# Patient Record
Sex: MALE | Race: WHITE | NOT HISPANIC OR LATINO | Employment: OTHER | URBAN - NONMETROPOLITAN AREA
[De-identification: names, ages, dates, MRNs, and addresses within clinical notes are randomized per-mention and may not be internally consistent; named-entity substitution may affect disease eponyms.]

---

## 2017-05-10 RX ORDER — ATORVASTATIN CALCIUM 80 MG/1
TABLET, FILM COATED ORAL
Qty: 90 TABLET | Refills: 3 | Status: SHIPPED | OUTPATIENT
Start: 2017-05-10 | End: 2018-02-21 | Stop reason: SDUPTHER

## 2017-08-09 RX ORDER — LOSARTAN POTASSIUM 25 MG/1
TABLET ORAL
Qty: 90 TABLET | Refills: 1 | Status: SHIPPED | OUTPATIENT
Start: 2017-08-09 | End: 2018-02-19 | Stop reason: SDUPTHER

## 2018-02-13 RX ORDER — LOSARTAN POTASSIUM 25 MG/1
TABLET ORAL
Qty: 90 TABLET | Refills: 1 | OUTPATIENT
Start: 2018-02-13

## 2018-02-19 RX ORDER — LOSARTAN POTASSIUM 25 MG/1
TABLET ORAL
Qty: 90 TABLET | Refills: 0 | Status: SHIPPED | OUTPATIENT
Start: 2018-02-19 | End: 2018-02-21 | Stop reason: SDUPTHER

## 2018-02-21 ENCOUNTER — OFFICE VISIT (OUTPATIENT)
Dept: CARDIOLOGY | Facility: CLINIC | Age: 73
End: 2018-02-21

## 2018-02-21 VITALS
HEART RATE: 75 BPM | OXYGEN SATURATION: 99 % | HEIGHT: 68 IN | SYSTOLIC BLOOD PRESSURE: 120 MMHG | WEIGHT: 217 LBS | BODY MASS INDEX: 32.89 KG/M2 | DIASTOLIC BLOOD PRESSURE: 80 MMHG

## 2018-02-21 DIAGNOSIS — E78.5 DYSLIPIDEMIA: ICD-10-CM

## 2018-02-21 DIAGNOSIS — K21.9 GASTROESOPHAGEAL REFLUX DISEASE, ESOPHAGITIS PRESENCE NOT SPECIFIED: ICD-10-CM

## 2018-02-21 DIAGNOSIS — I10 ESSENTIAL HYPERTENSION: ICD-10-CM

## 2018-02-21 DIAGNOSIS — E66.09 CLASS 1 OBESITY DUE TO EXCESS CALORIES WITH SERIOUS COMORBIDITY AND BODY MASS INDEX (BMI) OF 32.0 TO 32.9 IN ADULT: ICD-10-CM

## 2018-02-21 DIAGNOSIS — I25.810 CORONARY ARTERY DISEASE INVOLVING CORONARY BYPASS GRAFT OF NATIVE HEART WITHOUT ANGINA PECTORIS: Primary | ICD-10-CM

## 2018-02-21 PROBLEM — E66.811 CLASS 1 OBESITY DUE TO EXCESS CALORIES WITH SERIOUS COMORBIDITY AND BODY MASS INDEX (BMI) OF 32.0 TO 32.9 IN ADULT: Status: ACTIVE | Noted: 2018-02-21

## 2018-02-21 PROCEDURE — 99214 OFFICE O/P EST MOD 30 MIN: CPT | Performed by: INTERNAL MEDICINE

## 2018-02-21 PROCEDURE — 93000 ELECTROCARDIOGRAM COMPLETE: CPT | Performed by: INTERNAL MEDICINE

## 2018-02-21 RX ORDER — ATORVASTATIN CALCIUM 80 MG/1
80 TABLET, FILM COATED ORAL NIGHTLY
Qty: 90 TABLET | Refills: 3 | Status: ON HOLD | OUTPATIENT
Start: 2018-02-21 | End: 2018-03-25

## 2018-02-21 RX ORDER — LOSARTAN POTASSIUM 25 MG/1
TABLET ORAL
Qty: 90 TABLET | Refills: 3 | Status: ON HOLD | OUTPATIENT
Start: 2018-02-21 | End: 2018-03-25

## 2018-02-21 RX ORDER — CLOPIDOGREL BISULFATE 75 MG/1
75 TABLET ORAL DAILY
Qty: 90 TABLET | Refills: 3 | Status: SHIPPED | OUTPATIENT
Start: 2018-02-21 | End: 2019-03-11 | Stop reason: SDUPTHER

## 2018-02-21 NOTE — PROGRESS NOTES
Reason for Visit: cardiovascular follow up.    HPI:  Orlin Mims is a 72 y.o. male is here today for 18 month follow-up.  He has been doing well and not having any major issues.  Has noticed some heartburn.  Happens about once every couple of weeks.   Does not take any antacids.  He denies any angina and stays active.  Does not get much formal exercise.  Does not eat as healthy as he feels like he should.  Has not had cholesterol checked recently.  Does not check blood pressure at home.      Previous Cardiac Testing and Procedures:  - LHC (11/25/2014) severe three-vessel CAD  - 3 vessel CABG (12/01/2014) LIMA to LAD, SVG to OM, SVG to PDA    Patient Active Problem List   Diagnosis   • Coronary artery disease involving coronary bypass graft of native heart without angina pectoris   • Essential hypertension   • Dyslipidemia   • Class 1 obesity due to excess calories with serious comorbidity and body mass index (BMI) of 32.0 to 32.9 in adult       Social History   Substance Use Topics   • Smoking status: Passive Smoke Exposure - Never Smoker   • Smokeless tobacco: Never Used   • Alcohol use Yes      Comment: occ       Family History   Problem Relation Age of Onset   • Heart disease Mother    • Lung cancer Father        The following portions of the patient's history were reviewed and updated as appropriate: allergies, current medications, past family history, past medical history, past social history, past surgical history and problem list.      Current Outpatient Prescriptions:   •  atorvastatin (LIPITOR) 80 MG tablet, Take 1 tablet by mouth Every Night., Disp: 90 tablet, Rfl: 3  •  clopidogrel (PLAVIX) 75 MG tablet, Take 1 tablet by mouth Daily., Disp: 90 tablet, Rfl: 3  •  losartan (COZAAR) 25 MG tablet, Take one tablet daily, Disp: 90 tablet, Rfl: 3    Review of Systems   Constitution: Positive for malaise/fatigue. Negative for chills, decreased appetite and fever.   HENT: Negative for congestion and  "nosebleeds.    Eyes: Negative for blurred vision and double vision.   Cardiovascular: Positive for chest pain (tightness). Negative for irregular heartbeat, leg swelling and palpitations.   Respiratory: Positive for shortness of breath. Negative for cough.    Endocrine: Negative for cold intolerance and heat intolerance.   Hematologic/Lymphatic: Bruises/bleeds easily.   Skin: Negative for dry skin, itching and rash.   Musculoskeletal: Positive for arthritis and joint pain. Negative for back pain, muscle cramps and neck pain.   Gastrointestinal: Negative for abdominal pain, constipation, diarrhea, heartburn and melena.   Genitourinary: Negative for dysuria, frequency and hematuria.   Neurological: Positive for dizziness, light-headedness and loss of balance. Negative for headaches.   Psychiatric/Behavioral: Negative for depression. The patient does not have insomnia and is not nervous/anxious.        Objective   /80 (BP Location: Left arm, Patient Position: Sitting, Cuff Size: Adult)  Pulse 75  Ht 172.7 cm (68\")  Wt 98.4 kg (217 lb)  SpO2 99%  BMI 32.99 kg/m2  Physical Exam   Constitutional: He is oriented to person, place, and time. He appears well-developed and well-nourished.   HENT:   Head: Normocephalic and atraumatic.   Cardiovascular: Normal rate, regular rhythm and normal heart sounds.    No murmur heard.  Pulmonary/Chest: Effort normal and breath sounds normal.   Musculoskeletal: He exhibits no edema.   Neurological: He is alert and oriented to person, place, and time.   Skin: Skin is warm and dry.   Psychiatric: He has a normal mood and affect.       ECG 12 Lead  Date/Time: 2/21/2018 11:37 AM  Performed by: DALE SHEN  Authorized by: DALE SHEN   Comparison: compared with previous ECG from 11/24/2014  Similar to previous ECG  Rhythm: sinus rhythm  Rate: normal  Conduction: incomplete LBBB  Q waves: V1, V2 and V3                ICD-10-CM ICD-9-CM   1. Coronary artery disease involving " coronary bypass graft of native heart without angina pectoris I25.810 414.05   2. Essential hypertension I10 401.9   3. Dyslipidemia E78.5 272.4   4. Class 1 obesity due to excess calories with serious comorbidity and body mass index (BMI) of 32.0 to 32.9 in adult E66.09 278.00    Z68.32 V85.32   5. Gastroesophageal reflux disease, esophagitis presence not specified K21.9 530.81         Assessment/Plan:  1. CAD: History of 3 vessel CABG.  Has only atypical chest pain more consistent with GERD.  If persists can consider stress.  Continue Plavix and atorvastatin.    2.  Essential hypertension: Well controlled on losartan.    3.  Dyslipidemia: managed on atorvastatin.  Check lipid panel.    4.  Obesity: BMI 32.99.   on diet, exercise, and weight loss.    5.  GERD: Discussed starting PPI but patient declines.  Can reconsider if symptoms worsen.

## 2018-02-27 DIAGNOSIS — E78.5 DYSLIPIDEMIA: Primary | ICD-10-CM

## 2018-03-24 ENCOUNTER — APPOINTMENT (OUTPATIENT)
Dept: CT IMAGING | Facility: HOSPITAL | Age: 73
End: 2018-03-24

## 2018-03-24 ENCOUNTER — APPOINTMENT (OUTPATIENT)
Dept: GENERAL RADIOLOGY | Facility: HOSPITAL | Age: 73
End: 2018-03-24

## 2018-03-24 ENCOUNTER — HOSPITAL ENCOUNTER (OUTPATIENT)
Facility: HOSPITAL | Age: 73
Setting detail: OBSERVATION
Discharge: HOME | End: 2018-03-25
Attending: EMERGENCY MEDICINE | Admitting: FAMILY MEDICINE

## 2018-03-24 DIAGNOSIS — R07.9 CHEST PAIN, UNSPECIFIED TYPE: ICD-10-CM

## 2018-03-24 DIAGNOSIS — R55 SYNCOPE AND COLLAPSE: Primary | ICD-10-CM

## 2018-03-24 PROBLEM — R07.2 PRECORDIAL PAIN: Status: ACTIVE | Noted: 2018-03-24

## 2018-03-24 LAB
ALBUMIN SERPL-MCNC: 4.3 G/DL (ref 3.5–5)
ALBUMIN/GLOB SERPL: 1.3 G/DL (ref 1.1–2.5)
ALP SERPL-CCNC: 88 U/L (ref 24–120)
ALT SERPL W P-5'-P-CCNC: 43 U/L (ref 0–54)
ANION GAP SERPL CALCULATED.3IONS-SCNC: 11 MMOL/L (ref 4–13)
AST SERPL-CCNC: 40 U/L (ref 7–45)
BASOPHILS # BLD AUTO: 0.02 10*3/MM3 (ref 0–0.2)
BASOPHILS NFR BLD AUTO: 0.3 % (ref 0–2)
BILIRUB SERPL-MCNC: 1.8 MG/DL (ref 0.1–1)
BUN BLD-MCNC: 23 MG/DL (ref 5–21)
BUN/CREAT SERPL: 20.9 (ref 7–25)
CALCIUM SPEC-SCNC: 10.1 MG/DL (ref 8.4–10.4)
CHLORIDE SERPL-SCNC: 104 MMOL/L (ref 98–110)
CO2 SERPL-SCNC: 26 MMOL/L (ref 24–31)
CREAT BLD-MCNC: 1.1 MG/DL (ref 0.5–1.4)
D DIMER PPP FEU-MCNC: 0.29 MG/L (FEU) (ref 0–0.5)
DEPRECATED RDW RBC AUTO: 39 FL (ref 40–54)
EOSINOPHIL # BLD AUTO: 0.13 10*3/MM3 (ref 0–0.7)
EOSINOPHIL NFR BLD AUTO: 1.8 % (ref 0–4)
ERYTHROCYTE [DISTWIDTH] IN BLOOD BY AUTOMATED COUNT: 13.1 % (ref 12–15)
GFR SERPL CREATININE-BSD FRML MDRD: 66 ML/MIN/1.73
GLOBULIN UR ELPH-MCNC: 3.4 GM/DL
GLUCOSE BLD-MCNC: 105 MG/DL (ref 70–100)
HCT VFR BLD AUTO: 45.1 % (ref 40–52)
HGB BLD-MCNC: 15.1 G/DL (ref 14–18)
HOLD SPECIMEN: NORMAL
HOLD SPECIMEN: NORMAL
IMM GRANULOCYTES # BLD: 0.05 10*3/MM3 (ref 0–0.03)
IMM GRANULOCYTES NFR BLD: 0.7 % (ref 0–5)
INR PPP: 0.94 (ref 0.91–1.09)
LYMPHOCYTES # BLD AUTO: 1.65 10*3/MM3 (ref 0.72–4.86)
LYMPHOCYTES NFR BLD AUTO: 22.4 % (ref 15–45)
MCH RBC QN AUTO: 27.6 PG (ref 28–32)
MCHC RBC AUTO-ENTMCNC: 33.5 G/DL (ref 33–36)
MCV RBC AUTO: 82.4 FL (ref 82–95)
MONOCYTES # BLD AUTO: 0.66 10*3/MM3 (ref 0.19–1.3)
MONOCYTES NFR BLD AUTO: 9 % (ref 4–12)
NEUTROPHILS # BLD AUTO: 4.85 10*3/MM3 (ref 1.87–8.4)
NEUTROPHILS NFR BLD AUTO: 65.8 % (ref 39–78)
NRBC BLD MANUAL-RTO: 0 /100 WBC (ref 0–0)
PLATELET # BLD AUTO: 288 10*3/MM3 (ref 130–400)
PMV BLD AUTO: 10.3 FL (ref 6–12)
POTASSIUM BLD-SCNC: 4.4 MMOL/L (ref 3.5–5.3)
PROT SERPL-MCNC: 7.7 G/DL (ref 6.3–8.7)
PROTHROMBIN TIME: 12.8 SECONDS (ref 11.9–14.6)
RBC # BLD AUTO: 5.47 10*6/MM3 (ref 4.8–5.9)
SODIUM BLD-SCNC: 141 MMOL/L (ref 135–145)
TROPONIN I SERPL-MCNC: <0.012 NG/ML (ref 0–0.03)
TSH SERPL DL<=0.05 MIU/L-ACNC: 5.14 MIU/ML (ref 0.47–4.68)
WBC NRBC COR # BLD: 7.36 10*3/MM3 (ref 4.8–10.8)
WHOLE BLOOD HOLD SPECIMEN: NORMAL
WHOLE BLOOD HOLD SPECIMEN: NORMAL

## 2018-03-24 PROCEDURE — 84484 ASSAY OF TROPONIN QUANT: CPT | Performed by: FAMILY MEDICINE

## 2018-03-24 PROCEDURE — 85025 COMPLETE CBC W/AUTO DIFF WBC: CPT | Performed by: EMERGENCY MEDICINE

## 2018-03-24 PROCEDURE — G0378 HOSPITAL OBSERVATION PER HR: HCPCS

## 2018-03-24 PROCEDURE — 72125 CT NECK SPINE W/O DYE: CPT

## 2018-03-24 PROCEDURE — 94799 UNLISTED PULMONARY SVC/PX: CPT

## 2018-03-24 PROCEDURE — 85379 FIBRIN DEGRADATION QUANT: CPT | Performed by: EMERGENCY MEDICINE

## 2018-03-24 PROCEDURE — 70450 CT HEAD/BRAIN W/O DYE: CPT

## 2018-03-24 PROCEDURE — 85610 PROTHROMBIN TIME: CPT | Performed by: EMERGENCY MEDICINE

## 2018-03-24 PROCEDURE — 84484 ASSAY OF TROPONIN QUANT: CPT | Performed by: EMERGENCY MEDICINE

## 2018-03-24 PROCEDURE — 71046 X-RAY EXAM CHEST 2 VIEWS: CPT

## 2018-03-24 PROCEDURE — 84443 ASSAY THYROID STIM HORMONE: CPT | Performed by: FAMILY MEDICINE

## 2018-03-24 PROCEDURE — 94760 N-INVAS EAR/PLS OXIMETRY 1: CPT

## 2018-03-24 PROCEDURE — 80053 COMPREHEN METABOLIC PANEL: CPT | Performed by: EMERGENCY MEDICINE

## 2018-03-24 PROCEDURE — 93010 ELECTROCARDIOGRAM REPORT: CPT | Performed by: INTERNAL MEDICINE

## 2018-03-24 PROCEDURE — 99284 EMERGENCY DEPT VISIT MOD MDM: CPT

## 2018-03-24 PROCEDURE — 93005 ELECTROCARDIOGRAM TRACING: CPT | Performed by: EMERGENCY MEDICINE

## 2018-03-24 RX ORDER — ATORVASTATIN CALCIUM 40 MG/1
80 TABLET, FILM COATED ORAL NIGHTLY
Status: DISCONTINUED | OUTPATIENT
Start: 2018-03-24 | End: 2018-03-25 | Stop reason: HOSPADM

## 2018-03-24 RX ORDER — ALUMINA, MAGNESIA, AND SIMETHICONE 2400; 2400; 240 MG/30ML; MG/30ML; MG/30ML
15 SUSPENSION ORAL EVERY 6 HOURS PRN
Status: DISCONTINUED | OUTPATIENT
Start: 2018-03-24 | End: 2018-03-25 | Stop reason: HOSPADM

## 2018-03-24 RX ORDER — BISACODYL 5 MG/1
5 TABLET, DELAYED RELEASE ORAL DAILY PRN
Status: DISCONTINUED | OUTPATIENT
Start: 2018-03-24 | End: 2018-03-25 | Stop reason: HOSPADM

## 2018-03-24 RX ORDER — ASPIRIN 81 MG/1
324 TABLET, CHEWABLE ORAL ONCE
Status: COMPLETED | OUTPATIENT
Start: 2018-03-24 | End: 2018-03-24

## 2018-03-24 RX ORDER — ONDANSETRON 2 MG/ML
4 INJECTION INTRAMUSCULAR; INTRAVENOUS EVERY 6 HOURS PRN
Status: DISCONTINUED | OUTPATIENT
Start: 2018-03-24 | End: 2018-03-25 | Stop reason: HOSPADM

## 2018-03-24 RX ORDER — SODIUM CHLORIDE 0.9 % (FLUSH) 0.9 %
1-10 SYRINGE (ML) INJECTION AS NEEDED
Status: DISCONTINUED | OUTPATIENT
Start: 2018-03-24 | End: 2018-03-25 | Stop reason: HOSPADM

## 2018-03-24 RX ORDER — NITROGLYCERIN 0.4 MG/1
0.4 TABLET SUBLINGUAL
Status: DISCONTINUED | OUTPATIENT
Start: 2018-03-24 | End: 2018-03-25 | Stop reason: HOSPADM

## 2018-03-24 RX ORDER — ASPIRIN 81 MG/1
81 TABLET ORAL DAILY
COMMUNITY

## 2018-03-24 RX ORDER — LOSARTAN POTASSIUM 25 MG/1
25 TABLET ORAL
Status: DISCONTINUED | OUTPATIENT
Start: 2018-03-24 | End: 2018-03-25 | Stop reason: HOSPADM

## 2018-03-24 RX ORDER — ACETAMINOPHEN 325 MG/1
650 TABLET ORAL EVERY 4 HOURS PRN
Status: DISCONTINUED | OUTPATIENT
Start: 2018-03-24 | End: 2018-03-25 | Stop reason: HOSPADM

## 2018-03-24 RX ORDER — CLOPIDOGREL BISULFATE 75 MG/1
75 TABLET ORAL DAILY
Status: DISCONTINUED | OUTPATIENT
Start: 2018-03-24 | End: 2018-03-25 | Stop reason: HOSPADM

## 2018-03-24 RX ORDER — ASPIRIN 81 MG/1
81 TABLET ORAL DAILY
Status: DISCONTINUED | OUTPATIENT
Start: 2018-03-24 | End: 2018-03-25 | Stop reason: HOSPADM

## 2018-03-24 RX ORDER — TRAMADOL HYDROCHLORIDE 50 MG/1
50 TABLET ORAL EVERY 6 HOURS PRN
Status: DISCONTINUED | OUTPATIENT
Start: 2018-03-24 | End: 2018-03-25 | Stop reason: HOSPADM

## 2018-03-24 RX ADMIN — ASPIRIN 243 MG: 81 TABLET, CHEWABLE ORAL at 17:08

## 2018-03-24 RX ADMIN — DESMOPRESSIN ACETATE 80 MG: 0.2 TABLET ORAL at 21:39

## 2018-03-24 NOTE — H&P
[facility]  HISTORY AND PHYSICAL    Date of Admission: 3/24/2018  Primary Care Physician: Jm Retana MD    Subjective     Chief Complaint:   Passed out, chest pain    History of Present Illness    This 72 year old white male is admitted with a chief complaint of having passed out while walking up steps at work.  All walking up steps at work the patient began to feel dizzy and lightheaded.  At the top of the stairs he apparently fell back and struck his head.  He awakened on the landing below.  He states that he has no memory of the fall.  The patient awakened with chest discomfort in the center of his chest with radiation to his right neck.  He denies shortness of breath.  He denies change in mental status and states that he is at his typical baseline mentation.  An EKG obtained in the emergency department shows a left bundle branch block with no specific acute changes.  CT scan of the head and cervical spine show no acute intracranial or bony abnormalities.  Comprehensive metabolic panel is essentially within normal limits except for a bilirubin elevated at 1.8.   2 serial troponins in the emergency department were within normal limits.  CBC was unremarkable.  Chest x-ray shows evidence of previous mediastinal surgery but no acute cardio pulmonary process.      Review of Systems   Constitutional: Negative.    HENT: Negative.    Eyes: Negative.    Respiratory: Negative.    Cardiovascular: Positive for chest pain and leg swelling (mild).   Gastrointestinal: Positive for nausea.   Endocrine: Negative.    Genitourinary: Negative.    Musculoskeletal: Negative.    Skin: Negative.    Allergic/Immunologic: Negative.    Neurological: Positive for dizziness, syncope and light-headedness.   Hematological: Negative.    Psychiatric/Behavioral: Negative.       Otherwise complete ROS reviewed and negative except as mentioned in the HPI.      Past Medical History:    "  Past Medical History:   Diagnosis Date   • CAD (coronary artery disease)    • Cancer     Prostate   • Chest pain    • HLD (hyperlipidemia)    • HTN (hypertension)    • MI (myocardial infarction)        Past Surgical History:  Past Surgical History:   Procedure Laterality Date   • CORONARY ARTERY BYPASS GRAFT  2014       Family History:   family history includes Cancer in his father; Heart disease in his mother; Lung cancer in his father; No Known Problems in his brother and sister.    Social History:    reports that he is a non-smoker but has been exposed to tobacco smoke. He has never used smokeless tobacco. He reports that he drinks about 3.6 oz of alcohol per week . He reports that he does not use drugs.    Medications:  Prior to Admission medications    Medication Sig Start Date End Date Taking? Authorizing Provider   aspirin 81 MG EC tablet Take 81 mg by mouth Daily.   Yes Historical Provider, MD   atorvastatin (LIPITOR) 80 MG tablet Take 1 tablet by mouth Every Night. 2/21/18   Hector Beckman MD   clopidogrel (PLAVIX) 75 MG tablet Take 1 tablet by mouth Daily. 2/21/18   Hector Beckman MD   losartan (COZAAR) 25 MG tablet Take one tablet daily 2/21/18   Hector Beckman MD       Allergies:  No Known Allergies    Objective     Vital Signs:   /83   Pulse 71   Temp 97 °F (36.1 °C)   Resp 13   Ht 175.3 cm (69\")   Wt 91.6 kg (202 lb)   SpO2 97%   BMI 29.83 kg/m²     Physical Exam   Constitutional: He is oriented to person, place, and time. He appears well-developed and well-nourished. He is cooperative. No distress.   HENT:   Head: Normocephalic and atraumatic.   Right Ear: External ear normal.   Left Ear: External ear normal.   Nose: Nose normal.   Mouth/Throat: Oropharynx is clear and moist.   Eyes: Conjunctivae and EOM are normal. Pupils are equal, round, and reactive to light. No scleral icterus.   Neck: Normal range of motion. Neck supple. No JVD present. No tracheal deviation present. No " thyromegaly present.   Cardiovascular: Normal rate, regular rhythm, normal heart sounds and intact distal pulses.    No murmur heard.  Pulmonary/Chest: Effort normal and breath sounds normal. No respiratory distress. He has no wheezes.   Abdominal: Soft. Bowel sounds are normal. He exhibits no distension. There is tenderness (mild epigastric). There is no guarding.   Musculoskeletal: Normal range of motion. He exhibits edema (trace BLE). He exhibits no tenderness.   Neurological: He is alert and oriented to person, place, and time. He has normal reflexes. No cranial nerve deficit.   Skin: Skin is warm and dry. No rash noted.   Psychiatric: He has a normal mood and affect. His behavior is normal. Judgment and thought content normal.           Results Reviewed:  Lab Results (last 24 hours)     Procedure Component Value Units Date/Time    Troponin [[id number]] Collected:  03/24/18 1705    Specimen:  Blood Updated:  03/24/18 1708    Protime-INR [[id number]]  (Normal) Collected:  03/24/18 1435    Specimen:  Blood Updated:  03/24/18 1531     Protime 12.8 Seconds      INR 0.94    D-dimer, Quantitative [[id number]]  (Normal) Collected:  03/24/18 1435    Specimen:  Blood Updated:  03/24/18 1531     D-Dimer, Quantitative 0.29 mg/L (FEU)     CBC Auto Differential [[id number]]  (Abnormal) Collected:  03/24/18 1435    Specimen:  Blood Updated:  03/24/18 1524     WBC 7.36 10*3/mm3      RBC 5.47 10*6/mm3      Hemoglobin 15.1 g/dL      Hematocrit 45.1 %      MCV 82.4 fL      MCH 27.6 (L) pg      MCHC 33.5 g/dL      RDW 13.1 %      RDW-SD 39.0 (L) fl      MPV 10.3 fL      Platelets 288 10*3/mm3      Neutrophil % 65.8 %      Lymphocyte % 22.4 %      Monocyte % 9.0 %      Eosinophil % 1.8 %      Basophil % 0.3 %      Immature Grans % 0.7 %      Neutrophils, Absolute 4.85 10*3/mm3      Lymphocytes, Absolute 1.65 10*3/mm3      Monocytes, Absolute 0.66 10*3/mm3      Eosinophils, Absolute 0.13 10*3/mm3      Basophils, Absolute 0.02 10*3/mm3       Immature Grans, Absolute 0.05 (H) 10*3/mm3      nRBC 0.0 /100 WBC     Comprehensive Metabolic Panel [[id number]]  (Abnormal) Collected:  03/24/18 1435    Specimen:  Blood Updated:  03/24/18 1510     Glucose 105 (H) mg/dL      BUN 23 (H) mg/dL      Creatinine 1.10 mg/dL      Sodium 141 mmol/L      Potassium 4.4 mmol/L      Chloride 104 mmol/L      CO2 26.0 mmol/L      Calcium 10.1 mg/dL      Total Protein 7.7 g/dL      Albumin 4.30 g/dL      ALT (SGPT) 43 U/L      AST (SGOT) 40 U/L      Alkaline Phosphatase 88 U/L      Total Bilirubin 1.8 (H) mg/dL      eGFR Non African Amer 66 mL/min/1.73      Globulin 3.4 gm/dL      A/G Ratio 1.3 g/dL      BUN/Creatinine Ratio 20.9     Anion Gap 11.0 mmol/L     Troponin [[id number]]  (Normal) Collected:  03/24/18 1435    Specimen:  Blood Updated:  03/24/18 1508     Troponin I <0.012 ng/mL           Xr Chest 2 View    Result Date: 3/24/2018  Narrative: HISTORY: Chest pain  CXR: 2 views the chest performed.  COMPARISON: 12/2/2014  FINDINGS: Patient has undergone prior mediastinal surgery. There is no pulmonary consolidation or edema. The cardiac and mediastinal contours are unremarkable. There is no pleural effusion or pneumothorax. No acute bony pathology evident.      Impression: 1. Prior mediastinal surgery. No acute cardiopulmonary process. This report was finalized on 03/24/2018 16:11 by Dr. Elsie Ramos MD.    Ct Head Without Contrast    Result Date: 3/24/2018  Narrative: CT HEAD WO CONTRAST- 3/24/2018 3:36 PM CDT  HISTORY: fall, syncope  COMPARISON: None  DOSE LENGTH PRODUCT: 699 mGy cm. Automated exposure control was also utilized to decrease patient radiation dose.  TECHNIQUE: Axial images of brain obtained without IV contrast.  FINDINGS:  The ventricles are normal in size and configuration. No intracranial hemorrhage or mass effect is identified. There are no acute signs of ischemia. There is no extra-axial hematoma or subarachnoid hemorrhage.  No air-fluid level seen  within the visible paranasal sinuses. Mastoid air cells appear hypoplastic. There are calcifications in the distal left vertebral and internal carotid arteries. There is no depressed skull fracture.      Impression: 1. No acute intracranial abnormality. This report was finalized on 03/24/2018 16:00 by Dr. Elsie Ramos MD.    Ct Cervical Spine Without Contrast    Result Date: 3/24/2018  Narrative: CT CERVICAL SPINE WO CONTRAST- 3/24/2018 3:36 PM CDT  HISTORY: Neck pain, first study  COMPARISON: None  DOSE LENGTH PRODUCT: 231 mGy cm. Automated exposure control was also utilized to decrease patient radiation dose.  TECHNIQUE: Axial images of cervical spine obtained with sagittal coronal reconstructions.  FINDINGS:  The alignment of the cervical spine is appropriate. There is moderate right cervical facet osteoarthropathy with mild degenerative facet change on the left. Right greater than left uncinate process hypertrophy at the C4-C6 levels. No acute cervical vertebral fracture is identified. Bifid spinous processes at C6 and C7 considered versus old spinous process fractures. The degenerative changes result in mild cervical spinal canal stenosis with moderate to severe right neural foramen narrowing at the C4/C5 level with at least moderate narrowing on the right at C3/C4.      Impression: 1. Moderate right and mild left cervical facet osteoarthropathy with mild degenerative disc change. No acute cervical vertebral fracture or malalignment. 2. Bifid spinous processes at C6 and C7 versus old spinous process fractures. This report was finalized on 03/24/2018 16:03 by Dr. Elsie aRmos MD.     I have personally reviewed and interpreted the radiology studies and ECG obtained at time of admission.     Assessment / Plan      Assessment & Plan  Hospital Problem List     * (Principal)Precordial pain    Syncope and collapse    Coronary artery disease involving coronary bypass graft of native heart without angina pectoris     Essential hypertension    Dyslipidemia    Class 1 obesity due to excess calories with serious comorbidity and body mass index (BMI) of 32.0 to 32.9 in adult          PLAN:   Admit to the telemetry floor  Vital signs every 4 hours  Serial troponins  Stress echo tomorrow  Orthostatic blood pressure check  TSH, Lipid panel    Code Status:   Full code  Surrogate decision maker is his daughter     I discussed the patients findings and my recommendations with: The patient, his daughter and sister    Estimated length of stay: One day    Kyle Murcia DO   03/24/18   5:32 PM

## 2018-03-24 NOTE — ED PROVIDER NOTES
Subjective   Patient is a 72-year-old male who presents with syncope.  History of hypertension, hyperlipidemia, MI.  Patient was walking up stairs at a [employer] and felt dizzy and lightheaded.  He got to the top of the steps and fell back and hit his head.  Patient woke up on the landing long-term down the stairs.  He does not remember the fall.  No history of syncope.  He notes chest discomfort now that he describes as pressure.  Location is substernal with radiation into his right neck.  Associated with nausea.  No obvious exacerbating or relieving factors.  Described as mild and constant.  Patient unable to describe further.  No associated shortness of breath.  Nonpleuritic.  Patient denies any headache, focal numbness or weakness, slurred speech.  Denies any abdominal pain or vomiting.  Family states he is at his baseline mental status.            Review of Systems   Constitutional: Negative for fever.   HENT: Negative for sore throat.    Eyes: Negative for visual disturbance.   Respiratory: Negative for cough and shortness of breath.    Cardiovascular: Negative for chest pain.   Gastrointestinal: Positive for nausea. Negative for abdominal distention, abdominal pain and vomiting.   Genitourinary: Negative for hematuria.   Musculoskeletal: Negative for back pain.   Skin: Negative for rash.   Neurological: Positive for syncope and light-headedness. Negative for dizziness, tremors, seizures, speech difficulty, weakness, numbness and headaches.       Past Medical History:   Diagnosis Date   • CAD (coronary artery disease)    • Cancer     Prostate   • Chest pain    • HLD (hyperlipidemia)    • HTN (hypertension)    • MI (myocardial infarction)        No Known Allergies    Past Surgical History:   Procedure Laterality Date   • CORONARY ARTERY BYPASS GRAFT  2014       Family History   Problem Relation Age of Onset   • Heart disease Mother    • Lung cancer Father        Social History     Social History   • Marital  status: Single     Social History Main Topics   • Smoking status: Passive Smoke Exposure - Never Smoker   • Smokeless tobacco: Never Used   • Alcohol use Yes      Comment: occ   • Drug use: No     Other Topics Concern   • Not on file       Lab Results (last 24 hours)     Procedure Component Value Units Date/Time    Troponin [[id number]]  (Normal) Collected:  03/24/18 1435    Specimen:  Blood Updated:  03/24/18 1508     Troponin I <0.012 ng/mL     CBC & Differential [[id number]] Collected:  03/24/18 1435    Specimen:  Blood Updated:  03/24/18 1524    Narrative:       The following orders were created for panel order CBC & Differential.  Procedure                               Abnormality         Status                     ---------                               -----------         ------                     CBC Auto Differential[[id number]]        Abnormal            Final result                 Please view results for these tests on the individual orders.    Comprehensive Metabolic Panel [[id number]]  (Abnormal) Collected:  03/24/18 1435    Specimen:  Blood Updated:  03/24/18 1510     Glucose 105 (H) mg/dL      BUN 23 (H) mg/dL      Creatinine 1.10 mg/dL      Sodium 141 mmol/L      Potassium 4.4 mmol/L      Chloride 104 mmol/L      CO2 26.0 mmol/L      Calcium 10.1 mg/dL      Total Protein 7.7 g/dL      Albumin 4.30 g/dL      ALT (SGPT) 43 U/L      AST (SGOT) 40 U/L      Alkaline Phosphatase 88 U/L      Total Bilirubin 1.8 (H) mg/dL      eGFR Non African Amer 66 mL/min/1.73      Globulin 3.4 gm/dL      A/G Ratio 1.3 g/dL      BUN/Creatinine Ratio 20.9     Anion Gap 11.0 mmol/L     Protime-INR [[id number]]  (Normal) Collected:  03/24/18 1435    Specimen:  Blood Updated:  03/24/18 1531     Protime 12.8 Seconds      INR 0.94    D-dimer, Quantitative [[id number]]  (Normal) Collected:  03/24/18 1435    Specimen:  Blood Updated:  03/24/18 1531     D-Dimer, Quantitative 0.29 mg/L (FEU)     Narrative:       Reference Range is  0-0.50 mg/L FEU. However, results <0.50 mg/L FEU tends to rule out DVT or PE. Results >0.50 mg/L FEU are not useful in predicting absence or presence of DVT or PE.    CBC Auto Differential [[id number]]  (Abnormal) Collected:  03/24/18 1435    Specimen:  Blood Updated:  03/24/18 1524     WBC 7.36 10*3/mm3      RBC 5.47 10*6/mm3      Hemoglobin 15.1 g/dL      Hematocrit 45.1 %      MCV 82.4 fL      MCH 27.6 (L) pg      MCHC 33.5 g/dL      RDW 13.1 %      RDW-SD 39.0 (L) fl      MPV 10.3 fL      Platelets 288 10*3/mm3      Neutrophil % 65.8 %      Lymphocyte % 22.4 %      Monocyte % 9.0 %      Eosinophil % 1.8 %      Basophil % 0.3 %      Immature Grans % 0.7 %      Neutrophils, Absolute 4.85 10*3/mm3      Lymphocytes, Absolute 1.65 10*3/mm3      Monocytes, Absolute 0.66 10*3/mm3      Eosinophils, Absolute 0.13 10*3/mm3      Basophils, Absolute 0.02 10*3/mm3      Immature Grans, Absolute 0.05 (H) 10*3/mm3      nRBC 0.0 /100 WBC           Objective   Physical Exam   Constitutional: He is oriented to person, place, and time. Vital signs are normal. He appears well-developed and well-nourished. He is cooperative.  Non-toxic appearance. He does not have a sickly appearance. He does not appear ill. No distress.   HENT:   Head: Atraumatic.   Mouth/Throat: Oropharynx is clear and moist and mucous membranes are normal.   Eyes: EOM are normal. Pupils are equal, round, and reactive to light.   Neck: Normal range of motion. Neck supple. Normal carotid pulses present. No spinous process tenderness and no muscular tenderness present. Carotid bruit is not present. No no neck rigidity. Normal range of motion present.   Cardiovascular: Normal rate, regular rhythm, normal heart sounds and intact distal pulses.  Exam reveals no gallop and no friction rub.    No murmur heard.  Pulmonary/Chest: Effort normal and breath sounds normal. No respiratory distress. He has no wheezes. He has no rhonchi. He has no rales. He exhibits no tenderness,  "no crepitus, no edema and no swelling.   Abdominal: Soft. He exhibits no distension. There is no tenderness. There is no rebound, no guarding and no CVA tenderness.   Neurological: He is alert and oriented to person, place, and time. He has normal strength. He is not disoriented. No cranial nerve deficit or sensory deficit. Gait normal. GCS eye subscore is 4. GCS verbal subscore is 5. GCS motor subscore is 6.   Skin: Skin is warm and dry. Capillary refill takes less than 2 seconds.   Psychiatric: He has a normal mood and affect.   Nursing note and vitals reviewed.      ECG 12 Lead    Date/Time: 3/24/2018 3:00 PM  Performed by: CRISTINA ANTOINE  Authorized by: CRISTINA ANTOINE   Interpreted by physician  Comparison: compared with previous ECG   Similar to previous ECG  Rhythm: sinus rhythm  Rate: normal  QRS axis: left  Conduction: left bundle branch block  Clinical impression: abnormal ECG  Comments: Nonspecific ST and T-wave changes.  Left bundle branch block with left axis deviation.  Rate 69, AZ interval 168, , QTc 439.               XR Chest 2 View   Final Result   1. Prior mediastinal surgery. No acute cardiopulmonary process.   This report was finalized on 03/24/2018 16:11 by Dr. Elsie Ramos MD.      CT Cervical Spine Without Contrast   Final Result   1. Moderate right and mild left cervical facet osteoarthropathy with   mild degenerative disc change. No acute cervical vertebral fracture or   malalignment.   2. Bifid spinous processes at C6 and C7 versus old spinous process   fractures.   This report was finalized on 03/24/2018 16:03 by Dr. Elsie Ramos MD.      CT Head Without Contrast   Final Result   1. No acute intracranial abnormality.   This report was finalized on 03/24/2018 16:00 by Dr. Elsie Ramos MD.          /97   Pulse 68   Temp 97 °F (36.1 °C)   Resp 12   Ht 175.3 cm (69\")   Wt 91.6 kg (202 lb)   SpO2 97%   BMI 29.83 kg/m²     ED Course    ED Course   Comment " By Time   NEXUS criteria for C-spine imaging is negative. Eitan David MD 03/24 8910   This is a 72-year-old male who presents with syncopal episode.  EKG unchanged.  Patient did fall.  Head CT and C-spine CT negative for acute injury.  Troponin negative.  D-dimer negative.   Eitan David MD 03/24 1626   Given unclear story and not convincing for vasovagal syncope, and new chest pain, we will repeat ECG, given nitroglycerin, and aspirin.  I spoke to the hospitalist and we will admit for further syncope workup and serial enzymes. Eitan David MD 03/24 1630       Medications   nitroglycerin (NITROSTAT) SL tablet 0.4 mg (not administered)   aspirin chewable tablet 324 mg (not administered)            MDM  Number of Diagnoses or Management Options  Chest pain, unspecified type: new and requires workup  Syncope and collapse: new and requires workup     Amount and/or Complexity of Data Reviewed  Clinical lab tests: ordered and reviewed  Tests in the radiology section of CPT®: ordered and reviewed  Decide to obtain previous medical records or to obtain history from someone other than the patient: yes  Discuss the patient with other providers: yes    Risk of Complications, Morbidity, and/or Mortality  Presenting problems: moderate  Diagnostic procedures: moderate  Management options: moderate    Patient Progress  Patient progress: stable      Final diagnoses:   Syncope and collapse   Chest pain, unspecified type          Eitan David MD  03/24/18 2032

## 2018-03-24 NOTE — PLAN OF CARE
Problem: Patient Care Overview  Goal: Plan of Care Review  Outcome: Ongoing (interventions implemented as appropriate)   03/24/18 1808   Coping/Psychosocial   Plan of Care Reviewed With patient;daughter   Plan of Care Review   Progress no change   OTHER   Outcome Summary PATIENT ADMITTED TO  FROM ER WITH SYNCOPAL EPISODE. PATIENT HAD EPISODE OF BLACKING OUT WHILE CLIMBING STAIRS. CT HEAD/CSPINE BOTH NEGATIVE. VSS. NSR ONTELE. NPO AT MIDNIGHT FOR STRESS TEST. ADMISSSION COMPLETED EXCEPT FOR PTA MED LIST. GRANDDAUGHTER TO BRING MEDICATIONS FROM PATIENT HOME. SAFETY PRECAUTIONS IN PLACE. CONTINUE TO MONITOR, NOTIFY MD OF ANY CHANGES.     Goal: Individualization and Mutuality  Outcome: Ongoing (interventions implemented as appropriate)   03/24/18 1808   Individualization   Patient Specific Goals (Include Timeframe) NO SYNCOPAL EPISODES   Patient Specific Interventions SAFETY PRECAUTIONS   Mutuality/Individual Preferences   What Anxieties, Fears, Concerns, or Questions Do You Have About Your Care? NONE   What Information Would Help Us Give You More Personalized Care? NONE   Mutuality/Individual Preferences   How to Address Anxieties/Fears NONE     Goal: Discharge Needs Assessment  Outcome: Ongoing (interventions implemented as appropriate)   03/24/18 1808   Discharge Needs Assessment   Readmission Within the Last 30 Days no previous admission in last 30 days   Concerns to be Addressed no discharge needs identified;denies needs/concerns at this time   Patient/Family Anticipates Transition to home with family   Patient/Family Anticipated Services at Transition none   Transportation Concerns car, none   Transportation Anticipated car, drives self;family or friend will provide   Anticipated Changes Related to Illness none   Equipment Needed After Discharge none   Disability   Equipment Currently Used at Home none       Problem: Syncope (Adult)  Goal: Identify Related Risk Factors and Signs and Symptoms  Outcome: Ongoing  (interventions implemented as appropriate)   03/24/18 1808   Syncope (Adult)   Related Risk Factors (Syncope) medication effects   Signs and Symptoms (Syncope) dizziness;palpitations     Goal: Physical Safety/Health Maintenance  Outcome: Ongoing (interventions implemented as appropriate)   03/24/18 1808   Syncope (Adult)   Physical Safety/Health Maintenance making progress toward outcome     Goal: Optimal Emotional/Functional [place]  Outcome: Ongoing (interventions implemented as appropriate)   03/24/18 1808   Syncope (Adult)   Optimal Emotional/Functional [place] making progress toward outcome       Problem: Cardiac: ACS (Acute Coronary Syndrome) (Adult)  Goal: Signs and Symptoms of Listed Potential Problems Will be Absent, Minimized or Managed (Cardiac: ACS)  Outcome: Ongoing (interventions implemented as appropriate)   03/24/18 1808   Goal/Outcome Evaluation   Problems Assessed (Acute Coronary Syndrome) all   Problems Present (Acute Coronary Syn) situational response

## 2018-03-25 ENCOUNTER — APPOINTMENT (OUTPATIENT)
Dept: CARDIOLOGY | Facility: HOSPITAL | Age: 73
End: 2018-03-25
Attending: FAMILY MEDICINE

## 2018-03-25 VITALS
HEART RATE: 80 BPM | WEIGHT: 212.8 LBS | SYSTOLIC BLOOD PRESSURE: 117 MMHG | DIASTOLIC BLOOD PRESSURE: 79 MMHG | TEMPERATURE: 97.8 F | RESPIRATION RATE: 18 BRPM | HEIGHT: 69 IN | BODY MASS INDEX: 31.52 KG/M2 | OXYGEN SATURATION: 98 %

## 2018-03-25 PROBLEM — E03.9 ACQUIRED HYPOTHYROIDISM: Status: ACTIVE | Noted: 2018-03-25

## 2018-03-25 LAB
ARTICHOKE IGE QN: 108 MG/DL (ref 0–99)
BH CV STRESS BP STAGE 1: NORMAL
BH CV STRESS BP STAGE 2: NORMAL
BH CV STRESS BP STAGE 3: NORMAL
BH CV STRESS DURATION MIN STAGE 1: 3
BH CV STRESS DURATION MIN STAGE 2: 3
BH CV STRESS DURATION MIN STAGE 3: 1
BH CV STRESS DURATION SEC STAGE 1: 0
BH CV STRESS DURATION SEC STAGE 2: 0
BH CV STRESS DURATION SEC STAGE 3: 15
BH CV STRESS GRADE STAGE 1: 10
BH CV STRESS GRADE STAGE 2: 12
BH CV STRESS GRADE STAGE 3: 14
BH CV STRESS HR STAGE 1: 100
BH CV STRESS HR STAGE 2: 122
BH CV STRESS HR STAGE 3: 130
BH CV STRESS METS STAGE 1: 5
BH CV STRESS METS STAGE 2: 7.5
BH CV STRESS METS STAGE 3: 10
BH CV STRESS PROTOCOL 1: NORMAL
BH CV STRESS RECOVERY BP: NORMAL MMHG
BH CV STRESS RECOVERY HR: 86 BPM
BH CV STRESS SPEED STAGE 1: 1.7
BH CV STRESS SPEED STAGE 2: 2.5
BH CV STRESS SPEED STAGE 3: 3.4
BH CV STRESS STAGE 1: 1
BH CV STRESS STAGE 2: 2
BH CV STRESS STAGE 3: 3
CHOLEST SERPL-MCNC: 164 MG/DL (ref 130–200)
HDLC SERPL-MCNC: 22 MG/DL
LDLC/HDLC SERPL: 5.08 {RATIO}
MAXIMAL PREDICTED HEART RATE: 150 BPM
PERCENT MAX PREDICTED HR: 86.67 %
STRESS BASELINE BP: NORMAL MMHG
STRESS BASELINE HR: 69 BPM
STRESS PERCENT HR: 102 %
STRESS POST ESTIMATED WORKLOAD: 10 METS
STRESS POST EXERCISE DUR MIN: 7 MIN
STRESS POST EXERCISE DUR SEC: 15 SEC
STRESS POST PEAK BP: NORMAL MMHG
STRESS POST PEAK HR: 130 BPM
STRESS TARGET HR: 128 BPM
TRIGL SERPL-MCNC: 151 MG/DL (ref 0–149)
TROPONIN I SERPL-MCNC: <0.012 NG/ML (ref 0–0.03)

## 2018-03-25 PROCEDURE — G0378 HOSPITAL OBSERVATION PER HR: HCPCS

## 2018-03-25 PROCEDURE — 80061 LIPID PANEL: CPT | Performed by: FAMILY MEDICINE

## 2018-03-25 PROCEDURE — 25010000002 PERFLUTREN 6.52 MG/ML SUSPENSION: Performed by: FAMILY MEDICINE

## 2018-03-25 PROCEDURE — 93350 STRESS TTE ONLY: CPT | Performed by: INTERNAL MEDICINE

## 2018-03-25 PROCEDURE — 93350 STRESS TTE ONLY: CPT

## 2018-03-25 PROCEDURE — 93017 CV STRESS TEST TRACING ONLY: CPT

## 2018-03-25 PROCEDURE — 93352 ADMIN ECG CONTRAST AGENT: CPT | Performed by: INTERNAL MEDICINE

## 2018-03-25 PROCEDURE — 84484 ASSAY OF TROPONIN QUANT: CPT | Performed by: FAMILY MEDICINE

## 2018-03-25 PROCEDURE — 93018 CV STRESS TEST I&R ONLY: CPT | Performed by: INTERNAL MEDICINE

## 2018-03-25 RX ORDER — ATORVASTATIN CALCIUM 80 MG/1
80 TABLET, FILM COATED ORAL DAILY
COMMUNITY
End: 2019-03-11 | Stop reason: SDUPTHER

## 2018-03-25 RX ORDER — LEVOTHYROXINE SODIUM 0.05 MG/1
50 TABLET ORAL DAILY
Qty: 30 TABLET | Refills: 2 | Status: SHIPPED | OUTPATIENT
Start: 2018-03-25 | End: 2018-03-25

## 2018-03-25 RX ORDER — LEVOTHYROXINE SODIUM 0.05 MG/1
50 TABLET ORAL
Status: DISCONTINUED | OUTPATIENT
Start: 2018-03-26 | End: 2018-03-25 | Stop reason: HOSPADM

## 2018-03-25 RX ORDER — LOSARTAN POTASSIUM 25 MG/1
25 TABLET ORAL DAILY
COMMUNITY
End: 2019-03-11 | Stop reason: SDUPTHER

## 2018-03-25 RX ORDER — LEVOTHYROXINE SODIUM 0.05 MG/1
50 TABLET ORAL DAILY
Qty: 30 TABLET | Refills: 2 | Status: SHIPPED | OUTPATIENT
Start: 2018-03-25 | End: 2019-03-20

## 2018-03-25 RX ADMIN — PERFLUTREN 8.48 MG: 6.52 INJECTION, SUSPENSION INTRAVENOUS at 09:50

## 2018-03-25 RX ADMIN — ASPIRIN 81 MG: 81 TABLET ORAL at 09:18

## 2018-03-25 RX ADMIN — LOSARTAN POTASSIUM 25 MG: 25 TABLET ORAL at 09:18

## 2018-03-25 NOTE — DISCHARGE SUMMARY
[facility] Medicine Services  DISCHARGE SUMMARY       Date of Admission: 3/24/2018  Date of Discharge:  3/25/2018  Primary Care Physician: Jm Retana MD    Discharge Diagnoses:  Patient Active Problem List   Diagnosis   • Coronary artery disease involving coronary bypass graft of native heart without angina pectoris   • Essential hypertension   • Dyslipidemia   • Class 1 obesity due to excess calories with serious comorbidity and body mass index (BMI) of 32.0 to 32.9 in adult   • Syncope and collapse   • Precordial pain   • Acquired hypothyroidism         Presenting Problem/History of Present Illness:  Syncope and collapse [R55]     Chief Complaint on Day of Discharge:   Passed out, chest pain    History of Present Illness on Day of Discharge:   The patient has had no further symptoms since admission.  Stress test was low risk for ischemia.  Telemetry monitoring throughout his hospital stay showed no evidence of arrhythmia.    Hospital Course  his 72 year old white male is admitted with a chief complaint of having passed out while walking up steps at work.  All walking up steps at work the patient began to feel dizzy and lightheaded.  At the top of the stairs he apparently fell back and struck his head.  He awakened on the landing below.  He states that he has no memory of the fall.  The patient awakened with chest discomfort in the center of his chest with radiation to his right neck.  He denies shortness of breath.  He denies change in mental status and states that he is at his typical baseline mentation.  An EKG obtained in the emergency department shows a left bundle branch block with no specific acute changes.  CT scan of the head and cervical spine show no acute intracranial or bony abnormalities.  Comprehensive metabolic panel is essentially within normal limits except for a bilirubin elevated at 1.8.   2 serial troponins in the emergency department were within  normal limits.  CBC was unremarkable.  Chest x-ray shows evidence of previous mediastinal surgery but no acute cardio pulmonary process.  PLAN:   Admit to the telemetry floor  Vital signs every 4 hours  Serial troponins  Stress echo tomorrow  Orthostatic blood pressure check  TSH, Lipid panel    The patient's stress echo was within normal limits and his telemetry monitoring was completely within normal limits during his hospital stay.  The patient was found to be hypothyroid and the patient will be started on Synthroid for supplementation.  TSH should be rechecked in 6 weeks.  The patient and his family still have concerns for possible arrhythmia and I will schedule an appointment with Dr. Beckman's [place] office for follow up and to discuss the possible need for an event monitor placement.    Procedures Performed:   Stress echo:  Interpretation Summary        · The following left ventricular wall segments are akinetic: apical inferior, apical septal and apex.  · The following left ventricular wall segments are akinetic: apical septal and apex.     Findings are consistent with a previous septal infarction with no gross evidence of ischemia     Pertinent Test Results:   Lab Results (last 7 days)     Procedure Component Value Units Date/Time    Troponin [[id number]]  (Normal) Collected:  03/25/18 0648    Specimen:  Blood Updated:  03/25/18 0747     Troponin I <0.012 ng/mL     Lipid Panel [[id number]]  (Abnormal) Collected:  03/25/18 0648    Specimen:  Blood Updated:  03/25/18 0746     Total Cholesterol 164 mg/dL      Triglycerides 151 (H) mg/dL      HDL Cholesterol 22 (L) mg/dL      LDL Cholesterol  108 (H) mg/dL      LDL/HDL Ratio 5.08    Troponin [[id number]]  (Normal) Collected:  03/24/18 2243    Specimen:  Blood Updated:  03/24/18 2330     Troponin I <0.012 ng/mL     Troponin [[id number]]  (Normal) Collected:  03/24/18 2024    Specimen:  Blood Updated:  03/24/18 2117     Troponin I <0.012 ng/mL     TSH [[id number]]   (Abnormal) Collected:  03/24/18 1435    Specimen:  Blood Updated:  03/24/18 1956     TSH 5.140 (H) mIU/mL     Troponin [[id number]]  (Normal) Collected:  03/24/18 1705    Specimen:  Blood Updated:  03/24/18 1733     Troponin I <0.012 ng/mL     Protime-INR [[id number]]  (Normal) Collected:  03/24/18 1435    Specimen:  Blood Updated:  03/24/18 1531     Protime 12.8 Seconds      INR 0.94    D-dimer, Quantitative [[id number]]  (Normal) Collected:  03/24/18 1435    Specimen:  Blood Updated:  03/24/18 1531     D-Dimer, Quantitative 0.29 mg/L (FEU)     CBC Auto Differential [[id number]]  (Abnormal) Collected:  03/24/18 1435    Specimen:  Blood Updated:  03/24/18 1524     WBC 7.36 10*3/mm3      RBC 5.47 10*6/mm3      Hemoglobin 15.1 g/dL      Hematocrit 45.1 %      MCV 82.4 fL      MCH 27.6 (L) pg      MCHC 33.5 g/dL      RDW 13.1 %      RDW-SD 39.0 (L) fl      MPV 10.3 fL      Platelets 288 10*3/mm3      Neutrophil % 65.8 %      Lymphocyte % 22.4 %      Monocyte % 9.0 %      Eosinophil % 1.8 %      Basophil % 0.3 %      Immature Grans % 0.7 %      Neutrophils, Absolute 4.85 10*3/mm3      Lymphocytes, Absolute 1.65 10*3/mm3      Monocytes, Absolute 0.66 10*3/mm3      Eosinophils, Absolute 0.13 10*3/mm3      Basophils, Absolute 0.02 10*3/mm3      Immature Grans, Absolute 0.05 (H) 10*3/mm3      nRBC 0.0 /100 WBC     Comprehensive Metabolic Panel [[id number]]  (Abnormal) Collected:  03/24/18 1435    Specimen:  Blood Updated:  03/24/18 1510     Glucose 105 (H) mg/dL      BUN 23 (H) mg/dL      Creatinine 1.10 mg/dL      Sodium 141 mmol/L      Potassium 4.4 mmol/L      Chloride 104 mmol/L      CO2 26.0 mmol/L      Calcium 10.1 mg/dL      Total Protein 7.7 g/dL      Albumin 4.30 g/dL      ALT (SGPT) 43 U/L      AST (SGOT) 40 U/L      Alkaline Phosphatase 88 U/L      Total Bilirubin 1.8 (H) mg/dL      eGFR Non African Amer 66 mL/min/1.73      Globulin 3.4 gm/dL      A/G Ratio 1.3 g/dL      BUN/Creatinine Ratio 20.9     Anion Gap 11.0  mmol/L     Troponin [[id number]]  (Normal) Collected:  03/24/18 1435    Specimen:  Blood Updated:  03/24/18 1508     Troponin I <0.012 ng/mL         Imaging Results (last 7 days)     Procedure Component Value Units Date/Time    XR Chest 2 View [[id number]] Collected:  03/24/18 1610     Updated:  03/24/18 1614    Narrative:       HISTORY: Chest pain     CXR: 2 views the chest performed.     COMPARISON: 12/2/2014     FINDINGS: Patient has undergone prior mediastinal surgery. There is no  pulmonary consolidation or edema. The cardiac and mediastinal contours  are unremarkable. There is no pleural effusion or pneumothorax. No acute  bony pathology evident.       Impression:       1. Prior mediastinal surgery. No acute cardiopulmonary process.  This report was finalized on 03/24/2018 16:11 by Dr. Elsie Ramos MD.    CT Cervical Spine Without Contrast [[id number]] Collected:  03/24/18 1600     Updated:  03/24/18 1606    Narrative:       CT CERVICAL SPINE WO CONTRAST- 3/24/2018 3:36 PM CDT     HISTORY: Neck pain, first study      COMPARISON: None     DOSE LENGTH PRODUCT: 231 mGy cm. Automated exposure control was also  utilized to decrease patient radiation dose.     TECHNIQUE: Axial images of cervical spine obtained with sagittal coronal  reconstructions.     FINDINGS:  The alignment of the cervical spine is appropriate. There is  moderate right cervical facet osteoarthropathy with mild degenerative  facet change on the left. Right greater than left uncinate process  hypertrophy at the C4-C6 levels. No acute cervical vertebral fracture is  identified. Bifid spinous processes at C6 and C7 considered versus old  spinous process fractures. The degenerative changes result in mild  cervical spinal canal stenosis with moderate to severe right neural  foramen narrowing at the C4/C5 level with at least moderate narrowing on  the right at C3/C4.       Impression:       1. Moderate right and mild left cervical facet osteoarthropathy  "with  mild degenerative disc change. No acute cervical vertebral fracture or  malalignment.  2. Bifid spinous processes at C6 and C7 versus old spinous process  fractures.  This report was finalized on 03/24/2018 16:03 by Dr. Elsie Ramos MD.    CT Head Without Contrast [[id number]] Collected:  03/24/18 1559     Updated:  03/24/18 1603    Narrative:       CT HEAD WO CONTRAST- 3/24/2018 3:36 PM CDT     HISTORY: fall, syncope      COMPARISON: None     DOSE LENGTH PRODUCT: 699 mGy cm. Automated exposure control was also  utilized to decrease patient radiation dose.     TECHNIQUE: Axial images of brain obtained without IV contrast.     FINDINGS:  The ventricles are normal in size and configuration. No  intracranial hemorrhage or mass effect is identified. There are no acute  signs of ischemia. There is no extra-axial hematoma or subarachnoid  hemorrhage.     No air-fluid level seen within the visible paranasal sinuses. Mastoid  air cells appear hypoplastic. There are calcifications in the distal  left vertebral and internal carotid arteries. There is no depressed  skull fracture.       Impression:       1. No acute intracranial abnormality.  This report was finalized on 03/24/2018 16:00 by Dr. Elsie aRmos MD.        Condition on Discharge:    Stable    Physical Exam on Discharge:  /79 (BP Location: Right arm, Patient Position: Lying)   Pulse 80   Temp 97.8 °F (36.6 °C) (Temporal Artery )   Resp 18   Ht 175.3 cm (69\")   Wt 96.5 kg (212 lb 12.8 oz)   SpO2 98%   BMI 31.43 kg/m²   Physical Exam  Constitutional: He is oriented to person, place, and time. He appears well-developed and well-nourished. He is cooperative. No distress.   HENT:   Head: Normocephalic and atraumatic.   Right Ear: External ear normal.   Left Ear: External ear normal.   Nose: Nose normal.   Mouth/Throat: Oropharynx is clear and moist.   Eyes: Conjunctivae and EOM are normal. Pupils are equal, round, and reactive to light. No scleral " icterus.   Neck: Normal range of motion. Neck supple. No JVD present. No tracheal deviation present. No thyromegaly present.   Cardiovascular: Normal rate, regular rhythm, normal heart sounds and intact distal pulses.    No murmur heard.  Pulmonary/Chest: Effort normal and breath sounds normal. No respiratory distress. He has no wheezes.   Abdominal: Soft. Bowel sounds are normal. He exhibits no distension. There is tenderness (mild epigastric). There is no guarding.   Musculoskeletal: Normal range of motion. He exhibits edema (trace BLE). He exhibits no tenderness.   Neurological: He is alert and oriented to person, place, and time. He has normal reflexes. No cranial nerve deficit.   Skin: Skin is warm and dry. No rash noted.   Psychiatric: He has a normal mood and affect. His behavior is normal. Judgment and thought content normal.     Discharge Disposition:  Home or Self Care    Discharge Medications:   Orlin Mims   Home Medication Instructions HAYLEE:[id number]    Printed on:03/25/18 8736   Medication Information                      aspirin 81 MG EC tablet  Take 81 mg by mouth Daily.             atorvastatin (LIPITOR) 80 MG tablet  Take 80 mg by mouth Daily.             clopidogrel (PLAVIX) 75 MG tablet  Take 1 tablet by mouth Daily.             levothyroxine (SYNTHROID, LEVOTHROID) 50 MCG tablet  Take 1 tablet by mouth Daily.             losartan (COZAAR) 25 MG tablet  Take 25 mg by mouth Daily.                 Discharge Diet:   Diet Instructions     Diet: Regular; Thin       Discharge Diet:  Regular    Fluid Consistency:  Thin          Discharge Care Plan / Instructions:   Discharge home    Activity at Discharge:   Activity Instructions     Activity as Tolerated             Follow-up Appointments:  Follow-up with Dr. Vazquez in 2-4 weeks for assessment and possible scheduling of heart monitor to assess for possible arrhythmia.     Kyle Murcia DO  03/25/18  4:43 PM    Time: Discharge less than 30  min    Please note that portions of this note may have been completed with a voice recognition program. Efforts were made to edit the dictations, but occasionally words are mistranscribed.

## 2018-03-25 NOTE — PLAN OF CARE
Problem: Fall Risk (Adult)  Goal: Identify Related Risk Factors and Signs and Symptoms  Outcome: Ongoing (interventions implemented as appropriate)    Goal: Absence of Fall  Outcome: Ongoing (interventions implemented as appropriate)      Problem: Patient Care Overview  Goal: Plan of Care Review  Outcome: Ongoing (interventions implemented as appropriate)   03/25/18 0320   Coping/Psychosocial   Plan of Care Reviewed With patient   Plan of Care Review   Progress improving   OTHER   Outcome Summary VSS, no c/o pain or dizziness, NPO for stress echo in a.m., sinus 64-86 with freq. pvc, coup, mf, trig on telemetry,       Problem: Syncope (Adult)  Goal: Identify Related Risk Factors and Signs and Symptoms  Outcome: Ongoing (interventions implemented as appropriate)    Goal: Physical Safety/Health Maintenance  Outcome: Ongoing (interventions implemented as appropriate)    Goal: Optimal Emotional/Functional [place]  Outcome: Ongoing (interventions implemented as appropriate)      Problem: Cardiac: ACS (Acute Coronary Syndrome) (Adult)  Goal: Signs and Symptoms of Listed Potential Problems Will be Absent, Minimized or Managed (Cardiac: ACS)  Outcome: Ongoing (interventions implemented as appropriate)

## 2018-11-19 DIAGNOSIS — I25.810 CORONARY ARTERY DISEASE INVOLVING CORONARY BYPASS GRAFT OF NATIVE HEART WITHOUT ANGINA PECTORIS: ICD-10-CM

## 2018-11-19 RX ORDER — CLOPIDOGREL BISULFATE 75 MG/1
TABLET ORAL
Qty: 90 TABLET | Refills: 3 | OUTPATIENT
Start: 2018-11-19

## 2019-03-11 DIAGNOSIS — I25.810 CORONARY ARTERY DISEASE INVOLVING CORONARY BYPASS GRAFT OF NATIVE HEART WITHOUT ANGINA PECTORIS: ICD-10-CM

## 2019-03-12 RX ORDER — LOSARTAN POTASSIUM 25 MG/1
TABLET ORAL
Qty: 90 TABLET | Refills: 0 | Status: SHIPPED | OUTPATIENT
Start: 2019-03-12 | End: 2019-03-20 | Stop reason: SDUPTHER

## 2019-03-12 RX ORDER — ATORVASTATIN CALCIUM 80 MG/1
TABLET, FILM COATED ORAL
Qty: 90 TABLET | Refills: 0 | Status: SHIPPED | OUTPATIENT
Start: 2019-03-12 | End: 2019-03-20 | Stop reason: SDUPTHER

## 2019-03-12 RX ORDER — CLOPIDOGREL BISULFATE 75 MG/1
TABLET ORAL
Qty: 90 TABLET | Refills: 0 | Status: SHIPPED | OUTPATIENT
Start: 2019-03-12 | End: 2019-03-20 | Stop reason: SDUPTHER

## 2019-03-20 ENCOUNTER — OFFICE VISIT (OUTPATIENT)
Dept: CARDIOLOGY | Facility: CLINIC | Age: 74
End: 2019-03-20

## 2019-03-20 VITALS
HEIGHT: 69 IN | SYSTOLIC BLOOD PRESSURE: 138 MMHG | HEART RATE: 76 BPM | BODY MASS INDEX: 32.44 KG/M2 | DIASTOLIC BLOOD PRESSURE: 80 MMHG | OXYGEN SATURATION: 98 % | WEIGHT: 219 LBS

## 2019-03-20 DIAGNOSIS — I25.810 CORONARY ARTERY DISEASE INVOLVING CORONARY BYPASS GRAFT OF NATIVE HEART WITHOUT ANGINA PECTORIS: Primary | ICD-10-CM

## 2019-03-20 DIAGNOSIS — E78.5 DYSLIPIDEMIA: ICD-10-CM

## 2019-03-20 DIAGNOSIS — I10 ESSENTIAL HYPERTENSION: ICD-10-CM

## 2019-03-20 DIAGNOSIS — E66.09 CLASS 1 OBESITY DUE TO EXCESS CALORIES WITH SERIOUS COMORBIDITY AND BODY MASS INDEX (BMI) OF 32.0 TO 32.9 IN ADULT: ICD-10-CM

## 2019-03-20 PROBLEM — Z85.46 HISTORY OF PROSTATE CANCER: Status: ACTIVE | Noted: 2019-03-20

## 2019-03-20 PROCEDURE — 99214 OFFICE O/P EST MOD 30 MIN: CPT | Performed by: INTERNAL MEDICINE

## 2019-03-20 RX ORDER — ATORVASTATIN CALCIUM 80 MG/1
TABLET, FILM COATED ORAL
Qty: 90 TABLET | Refills: 3 | Status: ON HOLD | OUTPATIENT
Start: 2019-03-20 | End: 2020-07-22

## 2019-03-20 RX ORDER — CLOPIDOGREL BISULFATE 75 MG/1
75 TABLET ORAL DAILY
Qty: 90 TABLET | Refills: 3 | Status: SHIPPED | OUTPATIENT
Start: 2019-03-20 | End: 2022-04-20 | Stop reason: SDUPTHER

## 2019-03-20 RX ORDER — LOSARTAN POTASSIUM 50 MG/1
50 TABLET ORAL DAILY
Qty: 90 TABLET | Refills: 3 | Status: SHIPPED | OUTPATIENT
Start: 2019-03-20 | End: 2020-05-11 | Stop reason: SDUPTHER

## 2019-03-20 NOTE — PROGRESS NOTES
Reason for Visit: cardiovascular follow up.    HPI:  Orlin Mims is a 73 y.o. male is here today for 1 year follow-up.  He has been doing well and not having any major issues or complaints.  He has not been very active during the winter.  His weight has been relatively stable.  He does not check his blood pressure much at home.  He denies any chest pain, palpitations, dizziness, syncope, PND, or orthopnea.  He has not had a recent cholesterol check.  He notes he has no longer following Dr. Retana.    Previous Cardiac Testing and Procedures:  - LHC (11/25/2014) severe three-vessel CAD  - 3 vessel CABG (12/01/2014) LIMA to LAD, SVG to OM, SVG to PDA  - Lipid panel (3/25/2018) total cholesterol 164, HDL 22, , triglycerides 151    Patient Active Problem List   Diagnosis   • Coronary artery disease involving coronary bypass graft of native heart without angina pectoris   • Essential hypertension   • Dyslipidemia   • Class 1 obesity due to excess calories with serious comorbidity and body mass index (BMI) of 32.0 to 32.9 in adult   • Syncope and collapse   • Precordial pain   • Acquired hypothyroidism   • History of prostate cancer       Social History     Tobacco Use   • Smoking status: Never Smoker   • Smokeless tobacco: Never Used   Substance Use Topics   • Alcohol use: Yes     Alcohol/week: 3.6 oz     Types: 6 Cans of beer per week   • Drug use: No       Family History   Problem Relation Age of Onset   • Heart disease Mother    • Lung cancer Father    • Cancer Father    • No Known Problems Sister    • No Known Problems Brother        The following portions of the patient's history were reviewed and updated as appropriate: allergies, current medications, past family history, past medical history, past social history, past surgical history and problem list.      Current Outpatient Medications:   •  aspirin 81 MG EC tablet, Take 81 mg by mouth Daily., Disp: , Rfl:   •  atorvastatin (LIPITOR) 80 MG tablet, Take  "1 tablet nightly, Disp: 90 tablet, Rfl: 3  •  clopidogrel (PLAVIX) 75 MG tablet, Take 1 tablet by mouth Daily., Disp: 90 tablet, Rfl: 3  •  losartan (COZAAR) 50 MG tablet, Take 1 tablet by mouth Daily., Disp: 90 tablet, Rfl: 3    Review of Systems   Constitution: Negative for chills and fever.   Cardiovascular: Negative for chest pain and paroxysmal nocturnal dyspnea.   Respiratory: Negative for cough and shortness of breath.    Skin: Negative for rash.   Gastrointestinal: Negative for abdominal pain and heartburn.   Neurological: Negative for dizziness and numbness.       Objective   /80 (BP Location: Left arm, Patient Position: Sitting, Cuff Size: Adult)   Pulse 76   Ht 175.3 cm (69.02\")   Wt 99.3 kg (219 lb)   SpO2 98%   BMI 32.33 kg/m²   Physical Exam   Constitutional: He is oriented to person, place, and time. He appears well-developed and well-nourished.   HENT:   Head: Normocephalic and atraumatic.   Cardiovascular: Normal rate, regular rhythm and normal heart sounds.   No murmur heard.  Pulmonary/Chest: Effort normal and breath sounds normal.   Musculoskeletal: He exhibits no edema.   Neurological: He is alert and oriented to person, place, and time.   Skin: Skin is warm and dry.   Psychiatric: He has a normal mood and affect.     Procedures      ICD-10-CM ICD-9-CM   1. Coronary artery disease involving coronary bypass graft of native heart without angina pectoris I25.810 414.05   2. Essential hypertension I10 401.9   3. Dyslipidemia E78.5 272.4   4. Class 1 obesity due to excess calories with serious comorbidity and body mass index (BMI) of 32.0 to 32.9 in adult E66.09 278.00    Z68.32 V85.32         Assessment/Plan:  1. CAD: History of 3 vessel CABG.  Currently asymptomatic.   on lifestyle modification. Continue aspirin, Plavix, and atorvastatin.     2.  Essential hypertension: Blood pressure is mildly elevated today.  Will titrate up losartan to 50 mg.     3.  Dyslipidemia: Borderline " control on last lipid panel.  Continue atorvastatin.  Repeat lipid panel.     4.  Obesity: Patient's Body mass index is 32.33 kg/m². BMI is above normal parameters. Recommendations include: exercise counseling and nutrition counseling.

## 2019-03-26 ENCOUNTER — TELEPHONE (OUTPATIENT)
Dept: CARDIOLOGY | Facility: CLINIC | Age: 74
End: 2019-03-26

## 2019-03-26 RX ORDER — EZETIMIBE 10 MG/1
10 TABLET ORAL DAILY
Qty: 30 TABLET | Refills: 11 | Status: SHIPPED | OUTPATIENT
Start: 2019-03-26 | End: 2020-07-20

## 2019-03-26 NOTE — TELEPHONE ENCOUNTER
----- Message from Hector Beckman MD sent at 3/26/2019 12:17 PM CDT -----  I tried to call him but was unable to reach him.  His cholesterol is not optimally controlled.  Please let him know that his LDL (bad cholesterol is high) and HDL (good cholesterol) is low.  I added ezetimibe, which he should take in addition to the atorvastatin.      ----- Message -----  From: Saray Rosenberg MA  Sent: 3/26/2019  10:09 AM  To: Hector Beckman MD    3/25/2019 LIPID PANEL FOR YOUR REVIEW

## 2020-03-19 ENCOUNTER — TELEPHONE (OUTPATIENT)
Dept: CARDIOLOGY | Facility: CLINIC | Age: 75
End: 2020-03-19

## 2020-05-11 RX ORDER — LOSARTAN POTASSIUM 50 MG/1
50 TABLET ORAL DAILY
Qty: 90 TABLET | Refills: 3 | Status: SHIPPED | OUTPATIENT
Start: 2020-05-11 | End: 2022-04-20 | Stop reason: SDUPTHER

## 2020-07-20 ENCOUNTER — HOSPITAL ENCOUNTER (INPATIENT)
Facility: HOSPITAL | Age: 75
LOS: 4 days | Discharge: NURSING, REHAB OR LONG-TERM CARE | End: 2020-07-24
Attending: EMERGENCY MEDICINE | Admitting: INTERNAL MEDICINE

## 2020-07-20 ENCOUNTER — APPOINTMENT (OUTPATIENT)
Dept: MRI IMAGING | Facility: HOSPITAL | Age: 75
End: 2020-07-20

## 2020-07-20 ENCOUNTER — TELEPHONE (OUTPATIENT)
Dept: CARDIOLOGY | Facility: CLINIC | Age: 75
End: 2020-07-20

## 2020-07-20 ENCOUNTER — APPOINTMENT (OUTPATIENT)
Dept: CT IMAGING | Facility: HOSPITAL | Age: 75
End: 2020-07-20

## 2020-07-20 DIAGNOSIS — Z78.9 IMPAIRED MOBILITY AND ADLS: ICD-10-CM

## 2020-07-20 DIAGNOSIS — I99.8 INSUFFICIENCY OF POSTERIOR BRAIN CIRCULATION: Primary | ICD-10-CM

## 2020-07-20 DIAGNOSIS — Z74.09 IMPAIRED FUNCTIONAL MOBILITY, BALANCE, GAIT, AND ENDURANCE: ICD-10-CM

## 2020-07-20 DIAGNOSIS — R42 DIZZINESS: ICD-10-CM

## 2020-07-20 DIAGNOSIS — Z74.09 IMPAIRED MOBILITY AND ADLS: ICD-10-CM

## 2020-07-20 DIAGNOSIS — R00.1 BRADYCARDIA: ICD-10-CM

## 2020-07-20 DIAGNOSIS — H55.00 NYSTAGMUS: ICD-10-CM

## 2020-07-20 DIAGNOSIS — R42 VERTIGO: ICD-10-CM

## 2020-07-20 DIAGNOSIS — G45.0 VERTEBROBASILAR INSUFFICIENCY: ICD-10-CM

## 2020-07-20 LAB
ALBUMIN SERPL-MCNC: 4.3 G/DL (ref 3.5–5.2)
ALBUMIN/GLOB SERPL: 1.6 G/DL
ALP SERPL-CCNC: 89 U/L (ref 39–117)
ALT SERPL W P-5'-P-CCNC: 24 U/L (ref 1–41)
ANION GAP SERPL CALCULATED.3IONS-SCNC: 9 MMOL/L (ref 5–15)
AST SERPL-CCNC: 20 U/L (ref 1–40)
BASOPHILS # BLD AUTO: 0.03 10*3/MM3 (ref 0–0.2)
BASOPHILS NFR BLD AUTO: 0.3 % (ref 0–1.5)
BILIRUB SERPL-MCNC: 1.4 MG/DL (ref 0–1.2)
BUN SERPL-MCNC: 21 MG/DL (ref 8–23)
BUN/CREAT SERPL: 24.1 (ref 7–25)
CALCIUM SPEC-SCNC: 9.2 MG/DL (ref 8.6–10.5)
CHLORIDE SERPL-SCNC: 105 MMOL/L (ref 98–107)
CO2 SERPL-SCNC: 23 MMOL/L (ref 22–29)
CREAT SERPL-MCNC: 0.87 MG/DL (ref 0.76–1.27)
D DIMER PPP FEU-MCNC: 0.33 MG/L (FEU) (ref 0–0.5)
DEPRECATED RDW RBC AUTO: 41 FL (ref 37–54)
EOSINOPHIL # BLD AUTO: 0.06 10*3/MM3 (ref 0–0.4)
EOSINOPHIL NFR BLD AUTO: 0.6 % (ref 0.3–6.2)
ERYTHROCYTE [DISTWIDTH] IN BLOOD BY AUTOMATED COUNT: 13.2 % (ref 12.3–15.4)
GFR SERPL CREATININE-BSD FRML MDRD: 86 ML/MIN/1.73
GLOBULIN UR ELPH-MCNC: 2.7 GM/DL
GLUCOSE SERPL-MCNC: 134 MG/DL (ref 65–99)
HBA1C MFR BLD: 6 % (ref 4.8–5.6)
HCT VFR BLD AUTO: 44.9 % (ref 37.5–51)
HGB BLD-MCNC: 15 G/DL (ref 13–17.7)
IMM GRANULOCYTES # BLD AUTO: 0.04 10*3/MM3 (ref 0–0.05)
IMM GRANULOCYTES NFR BLD AUTO: 0.4 % (ref 0–0.5)
LYMPHOCYTES # BLD AUTO: 1.08 10*3/MM3 (ref 0.7–3.1)
LYMPHOCYTES NFR BLD AUTO: 11.6 % (ref 19.6–45.3)
MCH RBC QN AUTO: 28.4 PG (ref 26.6–33)
MCHC RBC AUTO-ENTMCNC: 33.4 G/DL (ref 31.5–35.7)
MCV RBC AUTO: 85 FL (ref 79–97)
MONOCYTES # BLD AUTO: 0.51 10*3/MM3 (ref 0.1–0.9)
MONOCYTES NFR BLD AUTO: 5.5 % (ref 5–12)
NEUTROPHILS NFR BLD AUTO: 7.56 10*3/MM3 (ref 1.7–7)
NEUTROPHILS NFR BLD AUTO: 81.6 % (ref 42.7–76)
NRBC BLD AUTO-RTO: 0 /100 WBC (ref 0–0.2)
PLATELET # BLD AUTO: 271 10*3/MM3 (ref 140–450)
PMV BLD AUTO: 10 FL (ref 6–12)
POTASSIUM SERPL-SCNC: 4.1 MMOL/L (ref 3.5–5.2)
PROT SERPL-MCNC: 7 G/DL (ref 6–8.5)
RBC # BLD AUTO: 5.28 10*6/MM3 (ref 4.14–5.8)
SARS-COV-2 RNA PNL SPEC NAA+PROBE: NOT DETECTED
SODIUM SERPL-SCNC: 137 MMOL/L (ref 136–145)
TROPONIN T SERPL-MCNC: <0.01 NG/ML (ref 0–0.03)
WBC # BLD AUTO: 9.28 10*3/MM3 (ref 3.4–10.8)

## 2020-07-20 PROCEDURE — 70498 CT ANGIOGRAPHY NECK: CPT

## 2020-07-20 PROCEDURE — 25010000002 ENOXAPARIN PER 10 MG: Performed by: INTERNAL MEDICINE

## 2020-07-20 PROCEDURE — 83036 HEMOGLOBIN GLYCOSYLATED A1C: CPT | Performed by: INTERNAL MEDICINE

## 2020-07-20 PROCEDURE — 99284 EMERGENCY DEPT VISIT MOD MDM: CPT

## 2020-07-20 PROCEDURE — 0 IOPAMIDOL PER 1 ML: Performed by: EMERGENCY MEDICINE

## 2020-07-20 PROCEDURE — 87635 SARS-COV-2 COVID-19 AMP PRB: CPT | Performed by: EMERGENCY MEDICINE

## 2020-07-20 PROCEDURE — 85379 FIBRIN DEGRADATION QUANT: CPT | Performed by: EMERGENCY MEDICINE

## 2020-07-20 PROCEDURE — 85025 COMPLETE CBC W/AUTO DIFF WBC: CPT | Performed by: EMERGENCY MEDICINE

## 2020-07-20 PROCEDURE — 93005 ELECTROCARDIOGRAM TRACING: CPT | Performed by: EMERGENCY MEDICINE

## 2020-07-20 PROCEDURE — 84484 ASSAY OF TROPONIN QUANT: CPT | Performed by: EMERGENCY MEDICINE

## 2020-07-20 PROCEDURE — 80053 COMPREHEN METABOLIC PANEL: CPT | Performed by: EMERGENCY MEDICINE

## 2020-07-20 PROCEDURE — 70551 MRI BRAIN STEM W/O DYE: CPT

## 2020-07-20 PROCEDURE — 93010 ELECTROCARDIOGRAM REPORT: CPT | Performed by: INTERNAL MEDICINE

## 2020-07-20 RX ORDER — LOSARTAN POTASSIUM 50 MG/1
50 TABLET ORAL DAILY
Status: DISCONTINUED | OUTPATIENT
Start: 2020-07-20 | End: 2020-07-24 | Stop reason: HOSPADM

## 2020-07-20 RX ORDER — CLOPIDOGREL BISULFATE 75 MG/1
75 TABLET ORAL DAILY
Status: DISCONTINUED | OUTPATIENT
Start: 2020-07-20 | End: 2020-07-24 | Stop reason: HOSPADM

## 2020-07-20 RX ORDER — ONDANSETRON 2 MG/ML
4 INJECTION INTRAMUSCULAR; INTRAVENOUS EVERY 6 HOURS PRN
Status: DISCONTINUED | OUTPATIENT
Start: 2020-07-20 | End: 2020-07-24 | Stop reason: HOSPADM

## 2020-07-20 RX ORDER — ATORVASTATIN CALCIUM 40 MG/1
80 TABLET, FILM COATED ORAL NIGHTLY
Status: DISCONTINUED | OUTPATIENT
Start: 2020-07-20 | End: 2020-07-24 | Stop reason: HOSPADM

## 2020-07-20 RX ORDER — SODIUM CHLORIDE 0.9 % (FLUSH) 0.9 %
10 SYRINGE (ML) INJECTION EVERY 12 HOURS SCHEDULED
Status: DISCONTINUED | OUTPATIENT
Start: 2020-07-20 | End: 2020-07-24 | Stop reason: HOSPADM

## 2020-07-20 RX ORDER — SODIUM CHLORIDE 0.9 % (FLUSH) 0.9 %
10 SYRINGE (ML) INJECTION AS NEEDED
Status: DISCONTINUED | OUTPATIENT
Start: 2020-07-20 | End: 2020-07-24 | Stop reason: HOSPADM

## 2020-07-20 RX ORDER — MECLIZINE HYDROCHLORIDE 25 MG/1
25 TABLET ORAL EVERY 8 HOURS SCHEDULED
Status: DISCONTINUED | OUTPATIENT
Start: 2020-07-20 | End: 2020-07-24 | Stop reason: HOSPADM

## 2020-07-20 RX ORDER — ASPIRIN 81 MG/1
81 TABLET ORAL DAILY
Status: DISCONTINUED | OUTPATIENT
Start: 2020-07-20 | End: 2020-07-24 | Stop reason: HOSPADM

## 2020-07-20 RX ADMIN — LOSARTAN POTASSIUM 50 MG: 50 TABLET, FILM COATED ORAL at 16:30

## 2020-07-20 RX ADMIN — ENOXAPARIN SODIUM 40 MG: 40 INJECTION SUBCUTANEOUS at 17:27

## 2020-07-20 RX ADMIN — ATORVASTATIN CALCIUM 80 MG: 40 TABLET, FILM COATED ORAL at 21:13

## 2020-07-20 RX ADMIN — MECLIZINE HYDROCHLORIDE 25 MG: 25 TABLET ORAL at 21:13

## 2020-07-20 RX ADMIN — IOPAMIDOL 100 ML: 755 INJECTION, SOLUTION INTRAVENOUS at 11:35

## 2020-07-20 RX ADMIN — ASPIRIN 81 MG: 81 TABLET ORAL at 16:30

## 2020-07-20 RX ADMIN — MECLIZINE HYDROCHLORIDE 25 MG: 25 TABLET ORAL at 16:30

## 2020-07-20 RX ADMIN — SODIUM CHLORIDE, PRESERVATIVE FREE 10 ML: 5 INJECTION INTRAVENOUS at 21:14

## 2020-07-20 NOTE — TELEPHONE ENCOUNTER
Pt called from [facility] and he would like for you to come seen him in the hospital. I advise that with him being in the hospital he may have to see the cardiologist on call. He states that his grandson texted to to let you know he is in room 359. He states that he does not understand the doctor that came in early to see him.

## 2020-07-21 PROBLEM — G45.0 VERTEBROBASILAR OCCLUSIVE DISEASE: Status: ACTIVE | Noted: 2020-07-21

## 2020-07-21 LAB
CHOLEST SERPL-MCNC: 150 MG/DL (ref 0–200)
HDLC SERPL-MCNC: 30 MG/DL (ref 40–60)
LDLC SERPL CALC-MCNC: 87 MG/DL (ref 0–100)
LDLC/HDLC SERPL: 2.91 {RATIO}
TRIGL SERPL-MCNC: 163 MG/DL (ref 0–150)
VLDLC SERPL-MCNC: 32.6 MG/DL

## 2020-07-21 PROCEDURE — 25010000002 ENOXAPARIN PER 10 MG: Performed by: INTERNAL MEDICINE

## 2020-07-21 PROCEDURE — 97163 PT EVAL HIGH COMPLEX 45 MIN: CPT | Performed by: PHYSICAL THERAPIST

## 2020-07-21 PROCEDURE — 99222 1ST HOSP IP/OBS MODERATE 55: CPT | Performed by: OTOLARYNGOLOGY

## 2020-07-21 PROCEDURE — 80061 LIPID PANEL: CPT | Performed by: INTERNAL MEDICINE

## 2020-07-21 PROCEDURE — 97116 GAIT TRAINING THERAPY: CPT

## 2020-07-21 RX ORDER — CLONAZEPAM 0.5 MG/1
0.5 TABLET ORAL EVERY 12 HOURS SCHEDULED
Status: DISCONTINUED | OUTPATIENT
Start: 2020-07-21 | End: 2020-07-24 | Stop reason: HOSPADM

## 2020-07-21 RX ADMIN — MECLIZINE HYDROCHLORIDE 25 MG: 25 TABLET ORAL at 13:04

## 2020-07-21 RX ADMIN — ATORVASTATIN CALCIUM 80 MG: 40 TABLET, FILM COATED ORAL at 21:19

## 2020-07-21 RX ADMIN — CLOPIDOGREL 75 MG: 75 TABLET, FILM COATED ORAL at 08:33

## 2020-07-21 RX ADMIN — MECLIZINE HYDROCHLORIDE 25 MG: 25 TABLET ORAL at 21:19

## 2020-07-21 RX ADMIN — CLONAZEPAM 0.5 MG: 0.5 TABLET ORAL at 21:19

## 2020-07-21 RX ADMIN — LOSARTAN POTASSIUM 50 MG: 50 TABLET, FILM COATED ORAL at 08:33

## 2020-07-21 RX ADMIN — MECLIZINE HYDROCHLORIDE 25 MG: 25 TABLET ORAL at 06:05

## 2020-07-21 RX ADMIN — SODIUM CHLORIDE, PRESERVATIVE FREE 10 ML: 5 INJECTION INTRAVENOUS at 21:19

## 2020-07-21 RX ADMIN — ASPIRIN 81 MG: 81 TABLET ORAL at 08:33

## 2020-07-21 RX ADMIN — SODIUM CHLORIDE, PRESERVATIVE FREE 10 ML: 5 INJECTION INTRAVENOUS at 08:34

## 2020-07-21 RX ADMIN — MECLIZINE HYDROCHLORIDE 25 MG: 25 TABLET ORAL at 08:33

## 2020-07-21 RX ADMIN — CLONAZEPAM 0.5 MG: 0.5 TABLET ORAL at 13:04

## 2020-07-21 RX ADMIN — ENOXAPARIN SODIUM 40 MG: 40 INJECTION SUBCUTANEOUS at 18:51

## 2020-07-22 PROCEDURE — 25010000002 ENOXAPARIN PER 10 MG: Performed by: INTERNAL MEDICINE

## 2020-07-22 PROCEDURE — 99231 SBSQ HOSP IP/OBS SF/LOW 25: CPT | Performed by: OTOLARYNGOLOGY

## 2020-07-22 PROCEDURE — 97116 GAIT TRAINING THERAPY: CPT

## 2020-07-22 RX ORDER — ATORVASTATIN CALCIUM 80 MG/1
80 TABLET, FILM COATED ORAL NIGHTLY
COMMUNITY

## 2020-07-22 RX ADMIN — ENOXAPARIN SODIUM 40 MG: 40 INJECTION SUBCUTANEOUS at 17:21

## 2020-07-22 RX ADMIN — MECLIZINE HYDROCHLORIDE 25 MG: 25 TABLET ORAL at 13:44

## 2020-07-22 RX ADMIN — ASPIRIN 81 MG: 81 TABLET ORAL at 08:48

## 2020-07-22 RX ADMIN — MECLIZINE HYDROCHLORIDE 25 MG: 25 TABLET ORAL at 07:10

## 2020-07-22 RX ADMIN — CLONAZEPAM 0.5 MG: 0.5 TABLET ORAL at 08:48

## 2020-07-22 RX ADMIN — ATORVASTATIN CALCIUM 80 MG: 40 TABLET, FILM COATED ORAL at 21:09

## 2020-07-22 RX ADMIN — CLOPIDOGREL 75 MG: 75 TABLET, FILM COATED ORAL at 08:48

## 2020-07-22 RX ADMIN — MECLIZINE HYDROCHLORIDE 25 MG: 25 TABLET ORAL at 21:09

## 2020-07-22 RX ADMIN — SODIUM CHLORIDE, PRESERVATIVE FREE 10 ML: 5 INJECTION INTRAVENOUS at 21:09

## 2020-07-22 RX ADMIN — SODIUM CHLORIDE, PRESERVATIVE FREE 10 ML: 5 INJECTION INTRAVENOUS at 08:48

## 2020-07-22 RX ADMIN — CLONAZEPAM 0.5 MG: 0.5 TABLET ORAL at 21:09

## 2020-07-22 RX ADMIN — LOSARTAN POTASSIUM 50 MG: 50 TABLET, FILM COATED ORAL at 08:48

## 2020-07-23 PROCEDURE — 25010000002 ENOXAPARIN PER 10 MG: Performed by: INTERNAL MEDICINE

## 2020-07-23 PROCEDURE — 97166 OT EVAL MOD COMPLEX 45 MIN: CPT | Performed by: OCCUPATIONAL THERAPIST

## 2020-07-23 PROCEDURE — 97110 THERAPEUTIC EXERCISES: CPT

## 2020-07-23 PROCEDURE — 97530 THERAPEUTIC ACTIVITIES: CPT

## 2020-07-23 PROCEDURE — 97116 GAIT TRAINING THERAPY: CPT

## 2020-07-23 RX ADMIN — ATORVASTATIN CALCIUM 80 MG: 40 TABLET, FILM COATED ORAL at 20:52

## 2020-07-23 RX ADMIN — CLONAZEPAM 0.5 MG: 0.5 TABLET ORAL at 20:53

## 2020-07-23 RX ADMIN — CLOPIDOGREL 75 MG: 75 TABLET, FILM COATED ORAL at 09:33

## 2020-07-23 RX ADMIN — CLONAZEPAM 0.5 MG: 0.5 TABLET ORAL at 09:33

## 2020-07-23 RX ADMIN — ASPIRIN 81 MG: 81 TABLET ORAL at 09:33

## 2020-07-23 RX ADMIN — MECLIZINE HYDROCHLORIDE 25 MG: 25 TABLET ORAL at 20:52

## 2020-07-23 RX ADMIN — SODIUM CHLORIDE, PRESERVATIVE FREE 10 ML: 5 INJECTION INTRAVENOUS at 20:53

## 2020-07-23 RX ADMIN — ENOXAPARIN SODIUM 40 MG: 40 INJECTION SUBCUTANEOUS at 17:21

## 2020-07-23 RX ADMIN — MECLIZINE HYDROCHLORIDE 25 MG: 25 TABLET ORAL at 06:33

## 2020-07-23 RX ADMIN — MECLIZINE HYDROCHLORIDE 25 MG: 25 TABLET ORAL at 15:22

## 2020-07-23 RX ADMIN — LOSARTAN POTASSIUM 50 MG: 50 TABLET, FILM COATED ORAL at 09:33

## 2020-07-23 RX ADMIN — SODIUM CHLORIDE, PRESERVATIVE FREE 10 ML: 5 INJECTION INTRAVENOUS at 09:33

## 2020-07-24 VITALS
SYSTOLIC BLOOD PRESSURE: 110 MMHG | TEMPERATURE: 97.6 F | DIASTOLIC BLOOD PRESSURE: 78 MMHG | WEIGHT: 210 LBS | HEART RATE: 72 BPM | RESPIRATION RATE: 16 BRPM | OXYGEN SATURATION: 99 % | BODY MASS INDEX: 31.1 KG/M2 | HEIGHT: 69 IN

## 2020-07-24 PROBLEM — R93.89 ABNORMAL COMPUTED TOMOGRAPHY ANGIOGRAPHY (CTA) OF NECK: Status: ACTIVE | Noted: 2020-07-24

## 2020-07-24 PROBLEM — R42 DIZZINESS: Status: ACTIVE | Noted: 2020-07-24

## 2020-07-24 PROBLEM — R26.89 LOSS OF BALANCE: Status: ACTIVE | Noted: 2020-07-24

## 2020-07-24 PROBLEM — H55.00 NYSTAGMUS: Status: ACTIVE | Noted: 2020-07-24

## 2020-07-24 PROCEDURE — 97530 THERAPEUTIC ACTIVITIES: CPT

## 2020-07-24 PROCEDURE — 97116 GAIT TRAINING THERAPY: CPT

## 2020-07-24 PROCEDURE — 94799 UNLISTED PULMONARY SVC/PX: CPT

## 2020-07-24 RX ORDER — MECLIZINE HYDROCHLORIDE 25 MG/1
25 TABLET ORAL EVERY 8 HOURS SCHEDULED
Start: 2020-07-24 | End: 2020-08-13 | Stop reason: SDUPTHER

## 2020-07-24 RX ORDER — CLONAZEPAM 0.5 MG/1
0.5 TABLET ORAL EVERY 12 HOURS SCHEDULED
Qty: 6 TABLET | Refills: 0 | Status: SHIPPED | OUTPATIENT
Start: 2020-07-24 | End: 2022-04-20

## 2020-07-24 RX ADMIN — ASPIRIN 81 MG: 81 TABLET ORAL at 09:10

## 2020-07-24 RX ADMIN — MECLIZINE HYDROCHLORIDE 25 MG: 25 TABLET ORAL at 06:15

## 2020-07-24 RX ADMIN — SODIUM CHLORIDE, PRESERVATIVE FREE 10 ML: 5 INJECTION INTRAVENOUS at 09:10

## 2020-07-24 RX ADMIN — CLONAZEPAM 0.5 MG: 0.5 TABLET ORAL at 09:10

## 2020-07-24 RX ADMIN — LOSARTAN POTASSIUM 50 MG: 50 TABLET, FILM COATED ORAL at 09:09

## 2020-07-24 RX ADMIN — CLOPIDOGREL 75 MG: 75 TABLET, FILM COATED ORAL at 09:10

## 2020-07-24 RX ADMIN — MECLIZINE HYDROCHLORIDE 25 MG: 25 TABLET ORAL at 13:05

## 2020-08-13 ENCOUNTER — OFFICE VISIT (OUTPATIENT)
Dept: CARDIOLOGY | Facility: CLINIC | Age: 75
End: 2020-08-13

## 2020-08-13 VITALS
BODY MASS INDEX: 32.29 KG/M2 | HEART RATE: 81 BPM | OXYGEN SATURATION: 98 % | HEIGHT: 69 IN | SYSTOLIC BLOOD PRESSURE: 130 MMHG | WEIGHT: 218 LBS | DIASTOLIC BLOOD PRESSURE: 82 MMHG

## 2020-08-13 DIAGNOSIS — E78.5 DYSLIPIDEMIA: ICD-10-CM

## 2020-08-13 DIAGNOSIS — I10 ESSENTIAL HYPERTENSION: ICD-10-CM

## 2020-08-13 DIAGNOSIS — I25.810 CORONARY ARTERY DISEASE INVOLVING CORONARY BYPASS GRAFT OF NATIVE HEART WITHOUT ANGINA PECTORIS: Primary | ICD-10-CM

## 2020-08-13 DIAGNOSIS — E66.09 CLASS 1 OBESITY DUE TO EXCESS CALORIES WITH SERIOUS COMORBIDITY AND BODY MASS INDEX (BMI) OF 32.0 TO 32.9 IN ADULT: ICD-10-CM

## 2020-08-13 DIAGNOSIS — G45.0 VERTEBROBASILAR OCCLUSIVE DISEASE: ICD-10-CM

## 2020-08-13 PROCEDURE — 99214 OFFICE O/P EST MOD 30 MIN: CPT | Performed by: INTERNAL MEDICINE

## 2020-08-13 RX ORDER — MECLIZINE HYDROCHLORIDE 25 MG/1
25 TABLET ORAL EVERY 8 HOURS SCHEDULED
Qty: 45 TABLET | Refills: 11 | Status: SHIPPED | OUTPATIENT
Start: 2020-08-13 | End: 2022-04-20

## 2020-08-13 NOTE — PROGRESS NOTES
Reason for Visit: cardiovascular follow up.    HPI:  Orlin Mims is a 74 y.o. male is here today for follow-up.  He has been doing relatively well lately.  He was recently admitted in July with worsening dizziness.  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  He feels symptoms have gotten a little better.  He reports that there is a problem filling the meclizine and he has not had that yet.  He has not had any recent cardiac symptoms and he denies any chest pain, palpitations, shortness of breath, PND, or orthopnea.    Previous Cardiac Testing and Procedures:  - LHC (11/25/2014) severe three-vessel CAD  - 3 vessel CABG (12/01/2014) LIMA to LAD, SVG to OM, SVG to PDA  - Lipid panel (3/25/2018) total cholesterol 164, HDL 22, , triglycerides 151  -Lipid panel (7/21/2020) total cholesterol 150, HDL 30, LDL 87, triglycerides 163    Patient Active Problem List   Diagnosis   • Coronary artery disease involving coronary bypass graft of native heart without angina pectoris   • Essential hypertension   • Dyslipidemia   • Class 1 obesity due to excess calories with serious comorbidity and body mass index (BMI) of 32.0 to 32.9 in adult   • Syncope and collapse   • Precordial pain   • Acquired hypothyroidism   • History of prostate cancer   • Vertebrobasilar occlusive disease   • Abnormal computed tomography angiography (CTA) of neck   • Dizziness   • Loss of balance   • Nystagmus       Social History     Tobacco Use   • Smoking status: Never Smoker   • Smokeless tobacco: Never Used   Substance Use Topics   • Alcohol use: Yes     Alcohol/week: 6.0 standard drinks     Types: 6 Cans of beer per week   • Drug use: No       Family History   Problem Relation Age of Onset   • Heart disease Mother    • Lung cancer Father    • Cancer Father    • No Known Problems Sister    • No Known Problems Brother        The following portions of the patient's history were reviewed and updated as appropriate:  "allergies, current medications, past family history, past medical history, past social history, past surgical history and problem list.      Current Outpatient Medications:   •  aspirin 81 MG EC tablet, Take 81 mg by mouth Daily., Disp: , Rfl:   •  atorvastatin (LIPITOR) 80 MG tablet, Take 80 mg by mouth Every Night., Disp: , Rfl:   •  clonazePAM (KlonoPIN) 0.5 MG tablet, Take 1 tablet by mouth Every 12 (Twelve) Hours., Disp: 6 tablet, Rfl: 0  •  clopidogrel (PLAVIX) 75 MG tablet, Take 1 tablet by mouth Daily., Disp: 90 tablet, Rfl: 3  •  losartan (COZAAR) 50 MG tablet, Take 1 tablet by mouth Daily., Disp: 90 tablet, Rfl: 3  •  meclizine (ANTIVERT) 25 MG tablet, Take 1 tablet by mouth Every 8 (Eight) Hours., Disp: 45 tablet, Rfl: 11    Review of Systems   Constitution: Negative for chills and fever.   Cardiovascular: Negative for chest pain and paroxysmal nocturnal dyspnea.   Respiratory: Negative for cough and shortness of breath.    Skin: Negative for rash.   Gastrointestinal: Negative for abdominal pain and heartburn.   Neurological: Positive for dizziness. Negative for numbness.       Objective   /82 (BP Location: Left arm, Patient Position: Sitting, Cuff Size: Adult)   Pulse 81   Ht 175.3 cm (69.02\")   Wt 98.9 kg (218 lb)   SpO2 98%   BMI 32.18 kg/m²   Physical Exam   Constitutional: He is oriented to person, place, and time. He appears well-developed and well-nourished.   HENT:   Head: Normocephalic and atraumatic.   Cardiovascular: Normal rate, regular rhythm and normal heart sounds.   No murmur heard.  Pulmonary/Chest: Effort normal and breath sounds normal.   Abdominal: He exhibits distension (obese).   Musculoskeletal: He exhibits no edema.   Neurological: He is alert and oriented to person, place, and time.   Skin: Skin is warm and dry.   Psychiatric: He has a normal mood and affect.     Procedures      ICD-10-CM ICD-9-CM   1. Coronary artery disease involving coronary bypass graft of native " heart without angina pectoris I25.810 414.05   2. Essential hypertension I10 401.9   3. Dyslipidemia E78.5 272.4   4. Class 1 obesity due to excess calories with serious comorbidity and body mass index (BMI) of 32.0 to 32.9 in adult E66.09 278.00    Z68.32 V85.32   5. Vertebrobasilar occlusive disease G45.0 433.20         Assessment/Plan:  1.  Coronary artery disease with history of 3 vessel CABG.  He remains chest pain-free. Continue aspirin, Plavix, and atorvastatin.     2.  Essential hypertension: Blood pressure is reasonably well controlled on current therapy.     3.  Dyslipidemia:  Acceptable control on atorvastatin.     4.  Obesity: Patient's Body mass index is 32.18 kg/m².  Weight remains stable.  BMI is above normal parameters. Recommendations include: exercise counseling and nutrition counseling.    5.  Vertebrobasilar disease: Diagnosed during recent hospital admission.  Dyspnea symptoms are stable.  Send in a new prescription for his meclizine.

## 2020-09-14 ENCOUNTER — OFFICE VISIT (OUTPATIENT)
Dept: OTOLARYNGOLOGY | Facility: CLINIC | Age: 75
End: 2020-09-14

## 2020-09-14 VITALS
BODY MASS INDEX: 33.03 KG/M2 | SYSTOLIC BLOOD PRESSURE: 123 MMHG | HEIGHT: 69 IN | WEIGHT: 223 LBS | TEMPERATURE: 98.4 F | HEART RATE: 72 BPM | DIASTOLIC BLOOD PRESSURE: 69 MMHG

## 2020-09-14 DIAGNOSIS — H69.83 DYSFUNCTION OF BOTH EUSTACHIAN TUBES: ICD-10-CM

## 2020-09-14 DIAGNOSIS — G45.0 VERTEBROBASILAR OCCLUSIVE DISEASE: Primary | ICD-10-CM

## 2020-09-14 PROBLEM — H69.93 DYSFUNCTION OF BOTH EUSTACHIAN TUBES: Status: ACTIVE | Noted: 2020-09-14

## 2020-09-14 PROCEDURE — 99214 OFFICE O/P EST MOD 30 MIN: CPT | Performed by: PHYSICIAN ASSISTANT

## 2020-09-14 RX ORDER — FLUTICASONE PROPIONATE 50 MCG
2 SPRAY, SUSPENSION (ML) NASAL DAILY
Qty: 16 G | Refills: 11 | Status: SHIPPED | OUTPATIENT
Start: 2020-09-14 | End: 2022-04-20

## 2020-09-14 RX ORDER — FLUTICASONE PROPIONATE 50 MCG
2 SPRAY, SUSPENSION (ML) NASAL DAILY
Qty: 16 G | Refills: 11 | Status: SHIPPED | OUTPATIENT
Start: 2020-09-14 | End: 2020-09-14

## 2020-09-14 NOTE — PATIENT INSTRUCTIONS
Start Flonase and Cawthorne - Cooksey exercises. Keep appointment with neurology. Follow-up as needed.     Advised to stop klonopin and Meclizine and only take as needed if dizziness is constant and severe.    Cawthorne - Cooksey Exercises    Exercises for the management of vertigo  These exercises are designed to stimulate or “work” the vestibular system and eventually lessen vertigo during daily activities.  Each exercise is to be done at least twice a day beginning with 5 repetitions each and increasing to 10 repetitions, if you are able.    A.  Head and eye movements while sittin. Keeping head still, look up and then down.  2. Keeping head still, look side to side.  3. Hold a finger out at arm’s length.  Focus on your finger and bring it toward your nose, then back again.  4. Move your head slowly side to side with your eyes open.  5. Move your head quickly side to side.  6. Move your head slowly up and down with your eyes closed.  7. Move your head quickly up and down.  8. Repeat numbers 4 through 7 with your eyes closed.    B.  Head and body movements while sittin. Place an object on the floor in front of you.  Reach down to pick it up, then return to an upright position.  Remember to look down at the object, then look back up when you bring your trunk back up.  2. Bend forward and pass the object back and forth under your knees.    C.  1.   Go from a sitting to standing position, then return to sitting.  2. Repeat this with eyes closed.  3.   Repeat number 1, but turn a full [place] while standing before sitting down.    Other Activities to improve balance:  1. Walk up and down stairs carefully with your eyes open, then closed.  Hold onto a handrail for safety, if needed.  2. While standing, practice making sudden 90 degree turns first with the eyes open, then closed.  3. While walking, look side to side.  This is best done in a grocery store;  read labels as you walk down the isle.  4. Practice  standing on one foot; first with the eyes open, then closed.  5. Standing on a soft surface, such as a egg crate mattress, pillow or foam mat:  a. First walk across the surface to get used to it.  b. If you have room walk heel to toe with the eyes open, then closed.  c. Practice standing on one foot with the eyes open, then closed.      FALL PREVENTION INSTRUCTIONS      1. Use prescribed walking aid all times.  2. Move through positional transitions slowly to avoid imbalance.  3. Use elevators instead of stairs or ramps, if possible.  4. Use handrails, whenever possible, if stairs and ramps must be used.  5. Avoid sitting in low, overstuffed chairs, if you have difficulty standing up. When standing up, scoot your hips to the edge of the chair prior to rising.  6. Wear low-heeled, laced shoes with a firm sole (no slippers).  7. Home modifications:  • Always keep a light on.  Turn a light on prior to entering any dark room.  • Keep electrical cords and small objects out of walking paths.  • Remove throw rugs.  • Keep frequently used items in the kitchen and bathroom within easy reach, preferably on eye-level shelves.  • Use nonskid decals or a rubber mat in the bathtub and shower.  • Install grab bars in the bathtub and shower, if needed.    8.   If rapid head movements cause imbalance, do not drive.

## 2020-09-14 NOTE — PROGRESS NOTES
NICK Gregory     Chief Complaint   Patient presents with    Dizziness     [facility] Follow up        HISTORY OF PRESENT ILLNESS:     Orlin Mims is a  74 y.o.  male who is here for follow up. He has had complaints of dizziness, lightheadedness and imbalance. The symptoms are not localized to a particular location. The symptoms severity was described as: moderate to severe and improving. The symptoms have been: present for the last several weeks The symptoms are aggravated by  changing position rapidly. The symptoms are improved by no identifiable factors.     Review of Systems   Constitutional: Negative for activity change, appetite change, chills, diaphoresis, fatigue, fever and unexpected weight change.   HENT: Negative for congestion, ear discharge, ear pain, facial swelling, hearing loss, mouth sores, nosebleeds, postnasal drip, rhinorrhea, sinus pressure, sneezing, sore throat, tinnitus, trouble swallowing and voice change.    Eyes: Negative for pain, discharge, redness, itching and visual disturbance.   Respiratory: Negative for apnea, cough, choking, chest tightness, shortness of breath, wheezing and stridor.    Gastrointestinal: Negative for nausea and vomiting.   Endocrine: Negative for cold intolerance and heat intolerance.   Musculoskeletal: Negative for arthralgias, back pain, gait problem, neck pain and neck stiffness.   Skin: Negative for rash.   Allergic/Immunologic: Negative for environmental allergies and food allergies.   Neurological: Positive for dizziness. Negative for tremors, seizures, syncope, facial asymmetry, speech difficulty, weakness, light-headedness, numbness and headaches.        Imbalance   Hematological: Negative for adenopathy. Does not bruise/bleed easily.   Psychiatric/Behavioral: Negative for behavioral problems, sleep disturbance and suicidal ideas. The patient is not nervous/anxious and is not hyperactive.    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     Past Surgical History:   Procedure Laterality Date   • COLONOSCOPY     • CORONARY ANGIOPLASTY WITH STENT PLACEMENT     • CORONARY ARTERY BYPASS GRAFT  2014   • PROSTATE SURGERY     • TONSILLECTOMY       Family History   Problem Relation Age of Onset   • Heart disease Mother    • Lung cancer Father    • Cancer Father    • No Known Problems Sister    • No Known Problems Brother      Social History     Tobacco Use   • Smoking status: Never Smoker   • Smokeless tobacco: Never Used   Substance Use Topics   • Alcohol use: Yes     Alcohol/week: 6.0 standard drinks     Types: 6 Cans of beer per week   • Drug use: No     Outpatient Medications Marked as Taking for the 9/14/20 encounter (Office Visit) with Charlie Villasenor PA   Medication Sig Dispense Refill   • aspirin 81 MG EC tablet Take 81 mg by mouth Daily.     • atorvastatin (LIPITOR) 80 MG tablet Take 80 mg by mouth Every Night.     • clonazePAM (KlonoPIN) 0.5 MG tablet Take 1 tablet by mouth Every 12 (Twelve) Hours. 6 tablet 0   • clopidogrel (PLAVIX) 75 MG tablet Take 1 tablet by mouth Daily. 90 tablet 3   • losartan (COZAAR) 50 MG tablet Take 1 tablet by mouth Daily. 90 tablet 3   • meclizine (ANTIVERT) 25 MG tablet Take 1 tablet by mouth Every 8 (Eight) Hours. 45 tablet 11     Allergies:  Patient has no known allergies.          Vital Signs:   Vitals:    09/14/20 1100   BP: 123/69   Pulse: 72   Temp: 98.4 °F (36.9 °C)         EXAMINATION:   CONSTITUTIONAL: well nourished, alert, oriented, in no acute distress     COMMUNICATION AND VOICE: able to communicate normally, normal voice quality    HEAD: normocephalic, no lesions, atraumatic, no tenderness, no masses      FACE: appearance normal, no lesions, no tenderness, no deformities, facial motion symmetric    SALIVARY GLANDS: parotid glands with no tenderness, no swelling, no masses, submandibular glands with normal size, nontender    EYES: ocular motility normal, eyelids normal, orbits normal, no proptosis, conjunctiva normal , pupils equal, round     EARS:  Hearing: response to conversational voice normal bilaterally   External Ears: auricles without lesions  Otoscopic: tympanic membrane appearance normal, no lesions, no perforation, normal mobility, no fluid    NOSE:  External Nose: structure normal, no tenderness on palpation, no nasal discharge, no lesions, no evidence of trauma, nostrils patent   Intranasal Exam: nasal mucosa normal, vestibule within normal limits, inferior turbinate normal, nasal septum midline     ORAL:  Lips: upper and lower lips without lesion   Teeth: dentition within normal limits for age   Gums: gingivae healthy   Oral Mucosa: oral mucosa normal, no mucosal lesions   Floor of Mouth: Warthin’s duct patent, mucosa normal  Tongue: lingual mucosa normal without lesions, normal tongue mobility   Palate: soft and hard palates with normal mucosa and structure  Oropharynx: oropharyngeal mucosa normal    NECK: neck appearance normal, no mass,  noted without erythema or tenderness    THYROID: no overt thyromegaly, no tenderness, nodules or mass present on palpation, position midline     LYMPH NODES: no lymphadenopathy    CHEST/RESPIRATORY: respiratory effort normal, normal breath sounds     CARDIOVASCULAR: rate and rhythm normal, extremities without cyanosis or edema      NEUROLOGIC/PSYCHIATRIC: oriented to time, place and person, mood normal, affect appropriate, CN II-XII intact grossly    RESULTS REVIEW:    I have reviewed the patients old records in the chart.       Assessment    Diagnosis Plan   1. Vertebrobasilar occlusive disease     2. Dysfunction of both eustachian tubes  fluticasone (FLONASE) 50  MCG/ACT nasal spray       Plan    Patient Instructions   Start Flonase and Cawthorne - Cooksey exercises. Keep appointment with neurology. Follow-up as needed.     Advised to stop klonopin and Meclizine and only take as needed if dizziness is constant and severe.    Cawthorne - Cooksey Exercises    Exercises for the management of vertigo  These exercises are designed to stimulate or “work” the vestibular system and eventually lessen vertigo during daily activities.  Each exercise is to be done at least twice a day beginning with 5 repetitions each and increasing to 10 repetitions, if you are able.    A.  Head and eye movements while sittin. Keeping head still, look up and then down.  2. Keeping head still, look side to side.  3. Hold a finger out at arm’s length.  Focus on your finger and bring it toward your nose, then back again.  4. Move your head slowly side to side with your eyes open.  5. Move your head quickly side to side.  6. Move your head slowly up and down with your eyes closed.  7. Move your head quickly up and down.  8. Repeat numbers 4 through 7 with your eyes closed.    B.  Head and body movements while sittin. Place an object on the floor in front of you.  Reach down to pick it up, then return to an upright position.  Remember to look down at the object, then look back up when you bring your trunk back up.  2. Bend forward and pass the object back and forth under your knees.    C.  1.   Go from a sitting to standing position, then return to sitting.  2. Repeat this with eyes closed.  3.   Repeat number 1, but turn a full [place] while standing before sitting down.    Other Activities to improve balance:  1. Walk up and down stairs carefully with your eyes open, then closed.  Hold onto a handrail for safety, if needed.  2. While standing, practice making sudden 90 degree turns first with the eyes open, then closed.  3. While walking, look side to side.  This is best done in a grocery store;   read labels as you walk down the isle.  4. Practice standing on one foot; first with the eyes open, then closed.  5. Standing on a soft surface, such as a egg crate mattress, pillow or foam mat:  a. First walk across the surface to get used to it.  b. If you have room walk heel to toe with the eyes open, then closed.  c. Practice standing on one foot with the eyes open, then closed.      FALL PREVENTION INSTRUCTIONS      1. Use prescribed walking aid all times.  2. Move through positional transitions slowly to avoid imbalance.  3. Use elevators instead of stairs or ramps, if possible.  4. Use handrails, whenever possible, if stairs and ramps must be used.  5. Avoid sitting in low, overstuffed chairs, if you have difficulty standing up. When standing up, scoot your hips to the edge of the chair prior to rising.  6. Wear low-heeled, laced shoes with a firm sole (no slippers).  7. Home modifications:  • Always keep a light on.  Turn a light on prior to entering any dark room.  • Keep electrical cords and small objects out of walking paths.  • Remove throw rugs.  • Keep frequently used items in the kitchen and bathroom within easy reach, preferably on eye-level shelves.  • Use nonskid decals or a rubber mat in the bathtub and shower.  • Install grab bars in the bathtub and shower, if needed.    8.   If rapid head movements cause imbalance, do not drive.        New Medications Ordered This Visit   Medications   • fluticasone (FLONASE) 50 MCG/ACT nasal spray     Si sprays into the nostril(s) as directed by provider Daily.     Dispense:  16 g     Refill:  11             Return if symptoms worsen or fail to improve.    NICK Gregory  20  18:13 CDT

## 2022-04-20 ENCOUNTER — OFFICE VISIT (OUTPATIENT)
Dept: CARDIOLOGY | Facility: CLINIC | Age: 77
End: 2022-04-20

## 2022-04-20 VITALS
SYSTOLIC BLOOD PRESSURE: 130 MMHG | BODY MASS INDEX: 34.1 KG/M2 | WEIGHT: 225 LBS | DIASTOLIC BLOOD PRESSURE: 80 MMHG | HEART RATE: 65 BPM | HEIGHT: 68 IN | OXYGEN SATURATION: 96 %

## 2022-04-20 DIAGNOSIS — I10 ESSENTIAL HYPERTENSION: ICD-10-CM

## 2022-04-20 DIAGNOSIS — E78.5 DYSLIPIDEMIA: ICD-10-CM

## 2022-04-20 DIAGNOSIS — I25.810 CORONARY ARTERY DISEASE INVOLVING CORONARY BYPASS GRAFT OF NATIVE HEART WITHOUT ANGINA PECTORIS: Primary | ICD-10-CM

## 2022-04-20 DIAGNOSIS — E66.09 CLASS 1 OBESITY DUE TO EXCESS CALORIES WITH SERIOUS COMORBIDITY AND BODY MASS INDEX (BMI) OF 34.0 TO 34.9 IN ADULT: ICD-10-CM

## 2022-04-20 PROBLEM — R07.2 PRECORDIAL PAIN: Status: RESOLVED | Noted: 2018-03-24 | Resolved: 2022-04-20

## 2022-04-20 PROCEDURE — 99214 OFFICE O/P EST MOD 30 MIN: CPT | Performed by: INTERNAL MEDICINE

## 2022-04-20 PROCEDURE — 93000 ELECTROCARDIOGRAM COMPLETE: CPT | Performed by: INTERNAL MEDICINE

## 2022-04-20 RX ORDER — EZETIMIBE 10 MG/1
10 TABLET ORAL DAILY
Qty: 30 TABLET | Refills: 11 | Status: SHIPPED | OUTPATIENT
Start: 2022-04-20 | End: 2022-04-20 | Stop reason: SDUPTHER

## 2022-04-20 RX ORDER — CLOPIDOGREL BISULFATE 75 MG/1
75 TABLET ORAL DAILY
Qty: 90 TABLET | Refills: 3 | Status: SHIPPED | OUTPATIENT
Start: 2022-04-20

## 2022-04-20 RX ORDER — EZETIMIBE 10 MG/1
10 TABLET ORAL DAILY
Qty: 90 TABLET | Refills: 3 | Status: SHIPPED | OUTPATIENT
Start: 2022-04-20

## 2022-04-20 RX ORDER — LOSARTAN POTASSIUM 50 MG/1
50 TABLET ORAL DAILY
Qty: 90 TABLET | Refills: 3 | Status: SHIPPED | OUTPATIENT
Start: 2022-04-20

## 2022-04-20 NOTE — PROGRESS NOTES
Reason for Visit: cardiovascular follow up.    HPI:  Orlin Mims is a 76 y.o. male is here today for follow-up.  He was last seen in cardiology clinic on 8/13/2020.  He is doing about the same.  He still gets a little dizzy in the morning.  He has some acid reflux as well.  He denies any chest pain, palpitations, dizziness, syncope, PND, or orthopnea.  He is active but does not get any formal exercise.  He doesn't really cook much and does not feel like he eats a lot.  He does snack some.  His weight is up by 7 lbs since last visit.  He reports that he is now prediabetic.    Previous Cardiac Testing and Procedures:  - LHC (11/25/2014) severe three-vessel CAD  - 3 vessel CABG (12/01/2014) LIMA to LAD, SVG to OM, SVG to PDA  - Lipid panel (3/25/2018) total cholesterol 164, HDL 22, , triglycerides 151  - Lipid panel (7/21/2020) total cholesterol 150, HDL 30, LDL 87, triglycerides 163  - Lipid panel (1/31/2022) total cholesterol 159, HDL 32, , triglycerides 130    Patient Active Problem List   Diagnosis   • Coronary artery disease involving coronary bypass graft of native heart without angina pectoris   • Essential hypertension   • Dyslipidemia   • Class 1 obesity due to excess calories with serious comorbidity and body mass index (BMI) of 34.0 to 34.9 in adult   • Syncope and collapse   • Acquired hypothyroidism   • History of prostate cancer   • Vertebrobasilar occlusive disease   • Abnormal computed tomography angiography (CTA) of neck   • Dizziness   • Loss of balance   • Nystagmus   • Dysfunction of both eustachian tubes       Social History     Tobacco Use   • Smoking status: Never Smoker   • Smokeless tobacco: Never Used   Substance Use Topics   • Alcohol use: Yes     Alcohol/week: 6.0 standard drinks     Types: 6 Cans of beer per week   • Drug use: No       Family History   Problem Relation Age of Onset   • Heart disease Mother    • Lung cancer Father    • Cancer Father    • No Known Problems  "Sister    • No Known Problems Brother        The following portions of the patient's history were reviewed and updated as appropriate: allergies, current medications, past family history, past medical history, past social history, past surgical history and problem list.      Current Outpatient Medications:   •  aspirin 81 MG EC tablet, Take 81 mg by mouth Daily., Disp: , Rfl:   •  atorvastatin (LIPITOR) 80 MG tablet, Take 80 mg by mouth Every Night., Disp: , Rfl:   •  clopidogrel (PLAVIX) 75 MG tablet, Take 1 tablet by mouth Daily., Disp: 90 tablet, Rfl: 3  •  ezetimibe (ZETIA) 10 MG tablet, Take 1 tablet by mouth Daily., Disp: 90 tablet, Rfl: 3  •  losartan (COZAAR) 50 MG tablet, Take 1 tablet by mouth Daily., Disp: 90 tablet, Rfl: 3    Review of Systems   Constitutional: Negative for chills and fever.   Cardiovascular: Negative for chest pain, irregular heartbeat, paroxysmal nocturnal dyspnea and syncope.   Respiratory: Negative for cough and shortness of breath.    Skin: Negative for rash.   Gastrointestinal: Negative for abdominal pain and heartburn.   Neurological: Positive for dizziness. Negative for numbness.       Objective   /80 (BP Location: Left arm, Patient Position: Sitting, Cuff Size: Adult)   Pulse 65   Ht 172.7 cm (68\")   Wt 102 kg (225 lb)   SpO2 96%   BMI 34.21 kg/m²   Constitutional:       Appearance: Well-developed. Obese.   HENT:      Head: Normocephalic and atraumatic.   Pulmonary:      Effort: Pulmonary effort is normal.      Breath sounds: Normal breath sounds.   Cardiovascular:      Normal rate. Regular rhythm.      Murmurs: There is no murmur.      No gallop. No click.   Skin:     General: Skin is warm and dry.   Neurological:      Mental Status: Alert and oriented to person, place, and time.         ECG 12 Lead    Date/Time: 4/20/2022 10:05 AM  Performed by: Hector Beckman MD  Authorized by: Hector Beckman MD   Comparison: compared with previous ECG from 7/20/2020  Similar " to previous ECG  Rhythm: sinus rhythm  Rate: normal  Conduction: left bundle branch block              ICD-10-CM ICD-9-CM   1. Coronary artery disease involving coronary bypass graft of native heart without angina pectoris  I25.810 414.05   2. Essential hypertension  I10 401.9   3. Dyslipidemia  E78.5 272.4   4. Class 1 obesity due to excess calories with serious comorbidity and body mass index (BMI) of 34.0 to 34.9 in adult  E66.09 278.00    Z68.34 V85.34         Assessment/Plan:  1.  Coronary artery disease with history of 3 vessel CABG.  No anginal symptoms.  Continue aspirin, Plavix, and atorvastatin.     2.  Essential hypertension: Blood pressure is acceptable today.  Continue losartan.     3.  Dyslipidemia:   Lipid panel from 1/31/2022 showed worsening control.  Continue atorvastatin and start ezetimibe.  Counseled on lifestyle modification.     4.  Obesity: Patient's Body mass index is 34.21 kg/m². indicating that he is obese (BMI >30). Obesity-related health conditions include the following: hypertension, coronary heart disease and dyslipidemias. Obesity is worsening. BMI is is above average; BMI management plan is completed. We discussed portion control and increasing exercise.

## 2023-04-19 ENCOUNTER — OFFICE VISIT (OUTPATIENT)
Dept: CARDIOLOGY | Facility: CLINIC | Age: 78
End: 2023-04-19
Payer: MEDICARE

## 2023-04-19 VITALS
DIASTOLIC BLOOD PRESSURE: 90 MMHG | HEART RATE: 71 BPM | WEIGHT: 230 LBS | HEIGHT: 68 IN | SYSTOLIC BLOOD PRESSURE: 144 MMHG | OXYGEN SATURATION: 99 % | BODY MASS INDEX: 34.86 KG/M2

## 2023-04-19 DIAGNOSIS — I25.708 CORONARY ARTERY DISEASE OF BYPASS GRAFT OF NATIVE HEART WITH STABLE ANGINA PECTORIS: Primary | ICD-10-CM

## 2023-04-19 DIAGNOSIS — E78.5 DYSLIPIDEMIA: ICD-10-CM

## 2023-04-19 DIAGNOSIS — E66.09 CLASS 1 OBESITY DUE TO EXCESS CALORIES WITH SERIOUS COMORBIDITY AND BODY MASS INDEX (BMI) OF 34.0 TO 34.9 IN ADULT: ICD-10-CM

## 2023-04-19 DIAGNOSIS — I10 ESSENTIAL HYPERTENSION: ICD-10-CM

## 2023-04-19 PROCEDURE — 93000 ELECTROCARDIOGRAM COMPLETE: CPT | Performed by: INTERNAL MEDICINE

## 2023-04-19 PROCEDURE — 99214 OFFICE O/P EST MOD 30 MIN: CPT | Performed by: INTERNAL MEDICINE

## 2023-04-19 PROCEDURE — 3080F DIAST BP >= 90 MM HG: CPT | Performed by: INTERNAL MEDICINE

## 2023-04-19 PROCEDURE — 3077F SYST BP >= 140 MM HG: CPT | Performed by: INTERNAL MEDICINE

## 2023-04-19 RX ORDER — NITROGLYCERIN 0.4 MG/1
TABLET SUBLINGUAL
Qty: 25 TABLET | Refills: 11 | Status: SHIPPED | OUTPATIENT
Start: 2023-04-19

## 2023-04-19 RX ORDER — CARVEDILOL 3.12 MG/1
3.12 TABLET ORAL 2 TIMES DAILY
Qty: 60 TABLET | Refills: 11 | Status: SHIPPED | OUTPATIENT
Start: 2023-04-19

## 2023-04-19 RX ORDER — EZETIMIBE 10 MG/1
10 TABLET ORAL DAILY
Qty: 90 TABLET | Refills: 3 | Status: SHIPPED | OUTPATIENT
Start: 2023-04-19

## 2023-04-19 NOTE — LETTER
April 19, 2023     Herminia Mejia MD  [address]  [address] [address]  [place] [address]    Patient: Orlin Mims   YOB: 1945   Date of Visit: 4/19/2023       Dear Herminia Mejia MD    Orlin Mims was in my office today. Below is a copy of my note.    If you have questions, please do not hesitate to call me. I look forward to following Orlin along with you.         Sincerely,        Hector Beckman MD        CC: No Recipients      Reason for Visit: cardiovascular follow up.    HPI:  Orlin Mims is a 77 y.o. male is here today for 1 year follow-up.  Over the past week or so he noticed some left arm pain, numbness, and chest pain.  He has had to take some nitroglycerin to help improve the pain.  Ezetimibe was started last visit but he is not aware of ever starting this.  His blood pressure is high today.  He does not check it much at home.  He stays active and recently remodeled a cabin.      Previous Cardiac Testing and Procedures:  - LHC (11/25/2014) severe three-vessel CAD  - 3 vessel CABG (12/01/2014) LIMA to LAD, SVG to OM, SVG to PDA    Lab data:  - Lipid panel (3/25/2018) total cholesterol 164, HDL 22, , triglycerides 151  - Lipid panel (7/21/2020) total cholesterol 150, HDL 30, LDL 87, triglycerides 163  - Lipid panel (1/31/2022) total cholesterol 159, HDL 32, , triglycerides 130  - Lipid panel (2/10/2023) total cholesterol 161, HDL 36, LDL 91, triglycerides 130    Patient Active Problem List   Diagnosis   • Coronary artery disease of bypass graft of native heart with stable angina pectoris   • Essential hypertension   • Dyslipidemia   • Class 1 obesity due to excess calories with serious comorbidity and body mass index (BMI) of 34.0 to 34.9 in adult   • Syncope and collapse   • Acquired hypothyroidism   • History of prostate cancer   • Vertebrobasilar occlusive disease   • Abnormal computed tomography angiography (CTA) of neck   • Dizziness   • Loss of balance   • Nystagmus   •  Dysfunction of both eustachian tubes       Social History     Tobacco Use   • Smoking status: Never   • Smokeless tobacco: Never   Vaping Use   • Vaping Use: Never used   Substance Use Topics   • Alcohol use: Yes     Alcohol/week: 6.0 standard drinks     Types: 6 Cans of beer per week   • Drug use: No       Family History   Problem Relation Age of Onset   • Heart disease Mother    • Lung cancer Father    • Cancer Father    • No Known Problems Sister    • No Known Problems Brother        The following portions of the patient's history were reviewed and updated as appropriate: allergies, current medications, past family history, past medical history, past social history, past surgical history and problem list.      Current Outpatient Medications:   •  atorvastatin (LIPITOR) 80 MG tablet, Take 1 tablet by mouth Every Night., Disp: , Rfl:   •  clopidogrel (PLAVIX) 75 MG tablet, Take 1 tablet by mouth Daily., Disp: 90 tablet, Rfl: 3  •  ezetimibe (ZETIA) 10 MG tablet, Take 1 tablet by mouth Daily., Disp: 90 tablet, Rfl: 3  •  losartan (COZAAR) 50 MG tablet, Take 1 tablet by mouth Daily., Disp: 90 tablet, Rfl: 3  •  aspirin 81 MG EC tablet, Take 81 mg by mouth Daily., Disp: , Rfl:   •  carvedilol (COREG) 3.125 MG tablet, Take 1 tablet by mouth 2 (Two) Times a Day., Disp: 60 tablet, Rfl: 11  •  nitroglycerin (NITROSTAT) 0.4 MG SL tablet, 1 under the tongue as needed for angina, may repeat q5mins for up three doses, Disp: 25 tablet, Rfl: 11    Review of Systems   Constitutional: Negative for chills and fever.   Cardiovascular: Positive for chest pain. Negative for paroxysmal nocturnal dyspnea.   Respiratory: Negative for cough and shortness of breath.    Skin: Negative for rash.   Musculoskeletal: Positive for arthritis.   Gastrointestinal: Negative for abdominal pain and heartburn.   Neurological: Positive for numbness. Negative for dizziness.       Objective    /90 (BP Location: Left arm, Patient Position: Sitting,  "Cuff Size: Adult)   Pulse 71   Ht 172.7 cm (67.99\")   Wt 104 kg (230 lb)   SpO2 99%   BMI 34.98 kg/m²   Constitutional:       Appearance: Well-developed. Obese.   HENT:      Head: Normocephalic and atraumatic.   Pulmonary:      Effort: Pulmonary effort is normal.      Breath sounds: Normal breath sounds.   Cardiovascular:      Normal rate. Regular rhythm.      Murmurs: There is no murmur.      No gallop. No click.   Skin:     General: Skin is warm and dry.   Neurological:      Mental Status: Alert and oriented to person, place, and time.         ECG 12 Lead    Date/Time: 4/19/2023 9:41 AM  Performed by: Hector Beckman MD  Authorized by: Hector Beckman MD   Comparison: compared with previous ECG from 4/20/2022  Rhythm: sinus rhythm  Rate: normal  Conduction: 1st degree AV block and non-specific intraventricular conduction delay  Q waves: V1                  ICD-10-CM ICD-9-CM   1. Coronary artery disease of bypass graft of native heart with stable angina pectoris  I25.708 414.05     413.9   2. Essential hypertension  I10 401.9   3. Dyslipidemia  E78.5 272.4   4. Class 1 obesity due to excess calories with serious comorbidity and body mass index (BMI) of 34.0 to 34.9 in adult  E66.09 278.00    Z68.34 V85.34         Assessment/Plan:  1. Coronary artery disease with history of 3 vessel CABG.  No having chest pain symptoms.  Schedule a nuclear stress.  Start carvedilol.  Continue aspirin, Plavix, and atorvastatin.     2.  Essential hypertension: Blood pressure is elevated today.  Start carvedilol to help blood pressure and CAD.       3.  Dyslipidemia: Lipid panel on 2/10/2023 showed mild improvement compared to previous, but still above goal.  Continue atorvastatin and start ezetimibe.       4.  Obesity: BMI is >= 30 and <35. (Class 1 Obesity). The following options were offered after discussion;: exercise counseling/recommendations and nutrition counseling/recommendations.   "

## 2023-04-19 NOTE — PROGRESS NOTES
Reason for Visit: cardiovascular follow up.    HPI:  Orlin Mims is a 77 y.o. male is here today for 1 year follow-up.  Over the past week or so he noticed some left arm pain, numbness, and chest pain.  He has had to take some nitroglycerin to help improve the pain.  Ezetimibe was started last visit but he is not aware of ever starting this.  His blood pressure is high today.  He does not check it much at home.  He stays active and recently remodeled a cabin.      Previous Cardiac Testing and Procedures:  - LHC (11/25/2014) severe three-vessel CAD  - 3 vessel CABG (12/01/2014) LIMA to LAD, SVG to OM, SVG to PDA    Lab data:  - Lipid panel (3/25/2018) total cholesterol 164, HDL 22, , triglycerides 151  - Lipid panel (7/21/2020) total cholesterol 150, HDL 30, LDL 87, triglycerides 163  - Lipid panel (1/31/2022) total cholesterol 159, HDL 32, , triglycerides 130  - Lipid panel (2/10/2023) total cholesterol 161, HDL 36, LDL 91, triglycerides 130    Patient Active Problem List   Diagnosis   • Coronary artery disease of bypass graft of native heart with stable angina pectoris   • Essential hypertension   • Dyslipidemia   • Class 1 obesity due to excess calories with serious comorbidity and body mass index (BMI) of 34.0 to 34.9 in adult   • Syncope and collapse   • Acquired hypothyroidism   • History of prostate cancer   • Vertebrobasilar occlusive disease   • Abnormal computed tomography angiography (CTA) of neck   • Dizziness   • Loss of balance   • Nystagmus   • Dysfunction of both eustachian tubes       Social History     Tobacco Use   • Smoking status: Never   • Smokeless tobacco: Never   Vaping Use   • Vaping Use: Never used   Substance Use Topics   • Alcohol use: Yes     Alcohol/week: 6.0 standard drinks     Types: 6 Cans of beer per week   • Drug use: No       Family History   Problem Relation Age of Onset   • Heart disease Mother    • Lung cancer Father    • Cancer Father    • No Known Problems  "Sister    • No Known Problems Brother        The following portions of the patient's history were reviewed and updated as appropriate: allergies, current medications, past family history, past medical history, past social history, past surgical history and problem list.      Current Outpatient Medications:   •  atorvastatin (LIPITOR) 80 MG tablet, Take 1 tablet by mouth Every Night., Disp: , Rfl:   •  clopidogrel (PLAVIX) 75 MG tablet, Take 1 tablet by mouth Daily., Disp: 90 tablet, Rfl: 3  •  ezetimibe (ZETIA) 10 MG tablet, Take 1 tablet by mouth Daily., Disp: 90 tablet, Rfl: 3  •  losartan (COZAAR) 50 MG tablet, Take 1 tablet by mouth Daily., Disp: 90 tablet, Rfl: 3  •  aspirin 81 MG EC tablet, Take 81 mg by mouth Daily., Disp: , Rfl:   •  carvedilol (COREG) 3.125 MG tablet, Take 1 tablet by mouth 2 (Two) Times a Day., Disp: 60 tablet, Rfl: 11  •  nitroglycerin (NITROSTAT) 0.4 MG SL tablet, 1 under the tongue as needed for angina, may repeat q5mins for up three doses, Disp: 25 tablet, Rfl: 11    Review of Systems   Constitutional: Negative for chills and fever.   Cardiovascular: Positive for chest pain. Negative for paroxysmal nocturnal dyspnea.   Respiratory: Negative for cough and shortness of breath.    Skin: Negative for rash.   Musculoskeletal: Positive for arthritis.   Gastrointestinal: Negative for abdominal pain and heartburn.   Neurological: Positive for numbness. Negative for dizziness.       Objective   /90 (BP Location: Left arm, Patient Position: Sitting, Cuff Size: Adult)   Pulse 71   Ht 172.7 cm (67.99\")   Wt 104 kg (230 lb)   SpO2 99%   BMI 34.98 kg/m²   Constitutional:       Appearance: Well-developed. Obese.   HENT:      Head: Normocephalic and atraumatic.   Pulmonary:      Effort: Pulmonary effort is normal.      Breath sounds: Normal breath sounds.   Cardiovascular:      Normal rate. Regular rhythm.      Murmurs: There is no murmur.      No gallop. No click.   Skin:     General: Skin " is warm and dry.   Neurological:      Mental Status: Alert and oriented to person, place, and time.         ECG 12 Lead    Date/Time: 4/19/2023 9:41 AM  Performed by: Hector Beckman MD  Authorized by: Hector Beckman MD   Comparison: compared with previous ECG from 4/20/2022  Rhythm: sinus rhythm  Rate: normal  Conduction: 1st degree AV block and non-specific intraventricular conduction delay  Q waves: V1                  ICD-10-CM ICD-9-CM   1. Coronary artery disease of bypass graft of native heart with stable angina pectoris  I25.708 414.05     413.9   2. Essential hypertension  I10 401.9   3. Dyslipidemia  E78.5 272.4   4. Class 1 obesity due to excess calories with serious comorbidity and body mass index (BMI) of 34.0 to 34.9 in adult  E66.09 278.00    Z68.34 V85.34         Assessment/Plan:  1. Coronary artery disease with history of 3 vessel CABG.  No having chest pain symptoms.  Schedule a nuclear stress.  Start carvedilol.  Continue aspirin, Plavix, and atorvastatin.     2.  Essential hypertension: Blood pressure is elevated today.  Start carvedilol to help blood pressure and CAD.       3.  Dyslipidemia: Lipid panel on 2/10/2023 showed mild improvement compared to previous, but still above goal.  Continue atorvastatin and start ezetimibe.       4.  Obesity: BMI is >= 30 and <35. (Class 1 Obesity). The following options were offered after discussion;: exercise counseling/recommendations and nutrition counseling/recommendations.

## 2023-05-17 ENCOUNTER — HOSPITAL ENCOUNTER (OUTPATIENT)
Dept: CARDIOLOGY | Facility: HOSPITAL | Age: 78
Discharge: HOME | End: 2023-05-17
Payer: MEDICARE

## 2023-05-17 VITALS
DIASTOLIC BLOOD PRESSURE: 78 MMHG | HEIGHT: 68 IN | BODY MASS INDEX: 34.75 KG/M2 | HEART RATE: 54 BPM | WEIGHT: 229.28 LBS | SYSTOLIC BLOOD PRESSURE: 131 MMHG

## 2023-05-17 PROCEDURE — 0 TECHNETIUM TETROFOSMIN KIT: Performed by: INTERNAL MEDICINE

## 2023-05-17 PROCEDURE — A9502 TC99M TETROFOSMIN: HCPCS | Performed by: INTERNAL MEDICINE

## 2023-05-17 PROCEDURE — 93017 CV STRESS TEST TRACING ONLY: CPT

## 2023-05-17 PROCEDURE — 78452 HT MUSCLE IMAGE SPECT MULT: CPT

## 2023-05-17 RX ADMIN — TETROFOSMIN 1 DOSE: 1.38 INJECTION, POWDER, LYOPHILIZED, FOR SOLUTION INTRAVENOUS at 11:08

## 2023-05-18 ENCOUNTER — TELEPHONE (OUTPATIENT)
Dept: CARDIOLOGY | Facility: CLINIC | Age: 78
End: 2023-05-18
Payer: MEDICARE

## 2023-05-18 NOTE — TELEPHONE ENCOUNTER
----- Message from Hector Beckman MD sent at 5/18/2023 10:59 AM CDT -----  Regarding: RE: Stress Test Results  Yes, he should take the carvedilol and ezetimibe.    I have not yet had a chance to review his stress test.  ----- Message -----  From: Abigail Stephen MA  Sent: 5/18/2023  10:22 AM CDT  To: Hector Beckman MD, Carrie Owens  Subject: RE: Stress Test Results                          Damon returned to pt daughter Jana. I told her that I have yet to received pt stress test result from Dr Beckman. But as soon as I get it, I will call her.  The stress test was done yesterday.     As far as pt meds, Dr Beckman placed pt in carvedilol and zetia at pt visit. The prescriptions were sent on 4/19 same day the pt was seen in the office.    The pt daughter was also concerned that her father was not notified when the stress test was scheduled. She had to call us and inquire when the test was scheduled. I told her that on 4/19/23 the pt was notified of stress test appt  for 5/17 at [facility]. She thinks her father is not remembering things lately.         ----- Message -----  From: Carrie Owens  Sent: 5/18/2023   9:39 AM CDT  To: Abigail Stephen MA  Subject: Stress Test Results                              Daughter Jana Mims called requesting stress test results from yesterday for pt. She states there are 2 medications that he was unsure of if he should continue or not. Requesting that she be called today with results. You can reach her at [phone].

## 2023-05-18 NOTE — TELEPHONE ENCOUNTER
Pt left a message on our answering service stating that he would like a phonecall in regards to his recent stress test results.

## 2023-05-19 ENCOUNTER — TELEPHONE (OUTPATIENT)
Dept: CARDIOLOGY | Facility: CLINIC | Age: 78
End: 2023-05-19
Payer: MEDICARE

## 2023-05-19 ENCOUNTER — PREP FOR SURGERY (OUTPATIENT)
Dept: CARDIOLOGY | Facility: CLINIC | Age: 78
End: 2023-05-19
Payer: MEDICARE

## 2023-05-19 DIAGNOSIS — I25.708 CORONARY ARTERY DISEASE OF BYPASS GRAFT OF NATIVE HEART WITH STABLE ANGINA PECTORIS: Primary | ICD-10-CM

## 2023-05-19 DIAGNOSIS — R94.39 ABNORMAL NUCLEAR STRESS TEST: ICD-10-CM

## 2023-05-19 LAB
BH CV NUCLEAR PRIOR STUDY: 3
BH CV REST NUCLEAR ISOTOPE DOSE: 9.8 MCI
BH CV STRESS BP STAGE 1: NORMAL
BH CV STRESS BP STAGE 2: NORMAL
BH CV STRESS DURATION MIN STAGE 1: 3
BH CV STRESS DURATION MIN STAGE 2: 2
BH CV STRESS DURATION SEC STAGE 1: 0
BH CV STRESS DURATION SEC STAGE 2: 23
BH CV STRESS GRADE STAGE 1: 10
BH CV STRESS GRADE STAGE 2: 12
BH CV STRESS HR STAGE 1: 91
BH CV STRESS HR STAGE 2: 130
BH CV STRESS METS STAGE 1: 5
BH CV STRESS METS STAGE 2: 7.5
BH CV STRESS NUCLEAR ISOTOPE DOSE: 33.4 MCI
BH CV STRESS PROTOCOL 1: NORMAL
BH CV STRESS RECOVERY BP: NORMAL MMHG
BH CV STRESS RECOVERY HR: 72 BPM
BH CV STRESS SPEED STAGE 1: 1.7
BH CV STRESS SPEED STAGE 2: 2.5
BH CV STRESS STAGE 1: 1
BH CV STRESS STAGE 2: 2
LV EF NUC BP: 40 %
MAXIMAL PREDICTED HEART RATE: 145 BPM
PERCENT MAX PREDICTED HR: 89.66 %
STRESS BASELINE BP: NORMAL MMHG
STRESS BASELINE HR: 55 BPM
STRESS PERCENT HR: 105 %
STRESS POST ESTIMATED WORKLOAD: 7.5 METS
STRESS POST EXERCISE DUR MIN: 5 MIN
STRESS POST EXERCISE DUR SEC: 23 SEC
STRESS POST PEAK BP: NORMAL MMHG
STRESS POST PEAK HR: 130 BPM
STRESS TARGET HR: 123 BPM

## 2023-05-19 RX ORDER — ISOSORBIDE MONONITRATE 30 MG/1
30 TABLET, EXTENDED RELEASE ORAL EVERY MORNING
Qty: 30 TABLET | Refills: 11 | Status: SHIPPED | OUTPATIENT
Start: 2023-05-19

## 2023-05-19 RX ORDER — SODIUM CHLORIDE 0.9 % (FLUSH) 0.9 %
10 SYRINGE (ML) INJECTION AS NEEDED
OUTPATIENT
Start: 2023-05-19

## 2023-05-19 RX ORDER — SODIUM CHLORIDE 9 MG/ML
40 INJECTION, SOLUTION INTRAVENOUS AS NEEDED
OUTPATIENT
Start: 2023-05-19

## 2023-05-19 RX ORDER — SODIUM CHLORIDE 0.9 % (FLUSH) 0.9 %
10 SYRINGE (ML) INJECTION EVERY 12 HOURS SCHEDULED
OUTPATIENT
Start: 2023-05-19

## 2023-05-19 NOTE — TELEPHONE ENCOUNTER
----- Message from Hector Beckman MD sent at 5/19/2023  1:05 PM CDT -----  I called the patient and discussed the results of the nuclear stress test.  There is a large size lateral wall defect with an area of possible daniel-infarct ischemia.  Ejection fraction reported at 40%.  He reports continued stable anginal symptoms with daily chest pain.  He has not had any symptoms severe enough to require nitroglycerin.  We discussed treatment options and after discussing the risk, benefits, and alternatives he elected to proceed with cardiac catheterization.  I placed an order to do this next Tuesday on 5/23/2023.

## 2023-05-23 ENCOUNTER — HOSPITAL ENCOUNTER (OUTPATIENT)
Facility: HOSPITAL | Age: 78
Setting detail: HOSPITAL OUTPATIENT SURGERY
Discharge: HOME | End: 2023-05-23
Attending: INTERNAL MEDICINE | Admitting: INTERNAL MEDICINE
Payer: MEDICARE

## 2023-05-23 VITALS
RESPIRATION RATE: 12 BRPM | OXYGEN SATURATION: 98 % | BODY MASS INDEX: 34.75 KG/M2 | SYSTOLIC BLOOD PRESSURE: 128 MMHG | TEMPERATURE: 98.3 F | HEIGHT: 68 IN | HEART RATE: 56 BPM | WEIGHT: 229.28 LBS | DIASTOLIC BLOOD PRESSURE: 73 MMHG

## 2023-05-23 DIAGNOSIS — R94.39 ABNORMAL NUCLEAR STRESS TEST: ICD-10-CM

## 2023-05-23 DIAGNOSIS — I25.708 CORONARY ARTERY DISEASE OF BYPASS GRAFT OF NATIVE HEART WITH STABLE ANGINA PECTORIS: ICD-10-CM

## 2023-05-23 LAB
ALBUMIN SERPL-MCNC: 4.3 G/DL (ref 3.5–5.2)
ALBUMIN/GLOB SERPL: 1.5 G/DL
ALP SERPL-CCNC: 96 U/L (ref 39–117)
ALT SERPL W P-5'-P-CCNC: 26 U/L (ref 1–41)
ANION GAP SERPL CALCULATED.3IONS-SCNC: 7 MMOL/L (ref 5–15)
AST SERPL-CCNC: 19 U/L (ref 1–40)
BASOPHILS # BLD AUTO: 0.04 10*3/MM3 (ref 0–0.2)
BASOPHILS NFR BLD AUTO: 0.5 % (ref 0–1.5)
BILIRUB SERPL-MCNC: 0.9 MG/DL (ref 0–1.2)
BUN SERPL-MCNC: 20 MG/DL (ref 8–23)
BUN/CREAT SERPL: 20 (ref 7–25)
CALCIUM SPEC-SCNC: 9.3 MG/DL (ref 8.6–10.5)
CHLORIDE SERPL-SCNC: 106 MMOL/L (ref 98–107)
CHOLEST SERPL-MCNC: 123 MG/DL (ref 0–200)
CO2 SERPL-SCNC: 25 MMOL/L (ref 22–29)
CREAT SERPL-MCNC: 1 MG/DL (ref 0.76–1.27)
DEPRECATED RDW RBC AUTO: 42.4 FL (ref 37–54)
EGFRCR SERPLBLD CKD-EPI 2021: 78.5 ML/MIN/1.73
EOSINOPHIL # BLD AUTO: 0.36 10*3/MM3 (ref 0–0.4)
EOSINOPHIL NFR BLD AUTO: 4.7 % (ref 0.3–6.2)
ERYTHROCYTE [DISTWIDTH] IN BLOOD BY AUTOMATED COUNT: 13.2 % (ref 12.3–15.4)
GLOBULIN UR ELPH-MCNC: 2.8 GM/DL
GLUCOSE SERPL-MCNC: 152 MG/DL (ref 65–99)
HCT VFR BLD AUTO: 45.6 % (ref 37.5–51)
HDLC SERPL-MCNC: 31 MG/DL (ref 40–60)
HGB BLD-MCNC: 14.6 G/DL (ref 13–17.7)
IMM GRANULOCYTES # BLD AUTO: 0.05 10*3/MM3 (ref 0–0.05)
IMM GRANULOCYTES NFR BLD AUTO: 0.7 % (ref 0–0.5)
LDLC SERPL CALC-MCNC: 74 MG/DL (ref 0–100)
LDLC/HDLC SERPL: 2.35 {RATIO}
LYMPHOCYTES # BLD AUTO: 1.45 10*3/MM3 (ref 0.7–3.1)
LYMPHOCYTES NFR BLD AUTO: 19 % (ref 19.6–45.3)
MCH RBC QN AUTO: 28.2 PG (ref 26.6–33)
MCHC RBC AUTO-ENTMCNC: 32 G/DL (ref 31.5–35.7)
MCV RBC AUTO: 88.2 FL (ref 79–97)
MONOCYTES # BLD AUTO: 0.62 10*3/MM3 (ref 0.1–0.9)
MONOCYTES NFR BLD AUTO: 8.1 % (ref 5–12)
NEUTROPHILS NFR BLD AUTO: 5.1 10*3/MM3 (ref 1.7–7)
NEUTROPHILS NFR BLD AUTO: 67 % (ref 42.7–76)
NRBC BLD AUTO-RTO: 0 /100 WBC (ref 0–0.2)
PLATELET # BLD AUTO: 237 10*3/MM3 (ref 140–450)
PMV BLD AUTO: 9.9 FL (ref 6–12)
POTASSIUM SERPL-SCNC: 4.2 MMOL/L (ref 3.5–5.2)
PROT SERPL-MCNC: 7.1 G/DL (ref 6–8.5)
RBC # BLD AUTO: 5.17 10*6/MM3 (ref 4.14–5.8)
SODIUM SERPL-SCNC: 138 MMOL/L (ref 136–145)
TRIGL SERPL-MCNC: 96 MG/DL (ref 0–150)
VLDLC SERPL-MCNC: 18 MG/DL (ref 5–40)
WBC NRBC COR # BLD: 7.62 10*3/MM3 (ref 3.4–10.8)

## 2023-05-23 PROCEDURE — 25510000001 IOPAMIDOL PER 1 ML: Performed by: INTERNAL MEDICINE

## 2023-05-23 PROCEDURE — 25010000002 HEPARIN (PORCINE) 1000-0.9 UT/500ML-% SOLUTION: Performed by: INTERNAL MEDICINE

## 2023-05-23 PROCEDURE — 80053 COMPREHEN METABOLIC PANEL: CPT | Performed by: INTERNAL MEDICINE

## 2023-05-23 PROCEDURE — 93455 CORONARY ART/GRFT ANGIO S&I: CPT | Performed by: INTERNAL MEDICINE

## 2023-05-23 PROCEDURE — S0260 H&P FOR SURGERY: HCPCS | Performed by: INTERNAL MEDICINE

## 2023-05-23 PROCEDURE — 25010000002 FENTANYL CITRATE (PF) 50 MCG/ML SOLUTION: Performed by: INTERNAL MEDICINE

## 2023-05-23 PROCEDURE — C1894 INTRO/SHEATH, NON-LASER: HCPCS | Performed by: INTERNAL MEDICINE

## 2023-05-23 PROCEDURE — 25010000002 MIDAZOLAM PER 1 MG: Performed by: INTERNAL MEDICINE

## 2023-05-23 PROCEDURE — C1769 GUIDE WIRE: HCPCS | Performed by: INTERNAL MEDICINE

## 2023-05-23 PROCEDURE — 99152 MOD SED SAME PHYS/QHP 5/>YRS: CPT | Performed by: INTERNAL MEDICINE

## 2023-05-23 PROCEDURE — 80061 LIPID PANEL: CPT | Performed by: INTERNAL MEDICINE

## 2023-05-23 PROCEDURE — 25010000002 HEPARIN (PORCINE) 2000-0.9 UNIT/L-% SOLUTION: Performed by: INTERNAL MEDICINE

## 2023-05-23 PROCEDURE — 85025 COMPLETE CBC W/AUTO DIFF WBC: CPT | Performed by: INTERNAL MEDICINE

## 2023-05-23 PROCEDURE — 25010000002 DIPHENHYDRAMINE PER 50 MG: Performed by: INTERNAL MEDICINE

## 2023-05-23 RX ORDER — SODIUM CHLORIDE 0.9 % (FLUSH) 0.9 %
10 SYRINGE (ML) INJECTION AS NEEDED
Status: DISCONTINUED | OUTPATIENT
Start: 2023-05-23 | End: 2023-05-23 | Stop reason: HOSPADM

## 2023-05-23 RX ORDER — HEPARIN SODIUM 200 [USP'U]/100ML
INJECTION, SOLUTION INTRAVENOUS
Status: DISCONTINUED | OUTPATIENT
Start: 2023-05-23 | End: 2023-05-23 | Stop reason: HOSPADM

## 2023-05-23 RX ORDER — ACETAMINOPHEN 325 MG/1
650 TABLET ORAL EVERY 4 HOURS PRN
Status: CANCELLED | OUTPATIENT
Start: 2023-05-23

## 2023-05-23 RX ORDER — LIDOCAINE HYDROCHLORIDE 20 MG/ML
INJECTION, SOLUTION INFILTRATION; PERINEURAL
Status: DISCONTINUED | OUTPATIENT
Start: 2023-05-23 | End: 2023-05-23 | Stop reason: HOSPADM

## 2023-05-23 RX ORDER — SODIUM CHLORIDE 9 MG/ML
250 INJECTION, SOLUTION INTRAVENOUS ONCE AS NEEDED
Status: CANCELLED | OUTPATIENT
Start: 2023-05-23

## 2023-05-23 RX ORDER — MIDAZOLAM HYDROCHLORIDE 1 MG/ML
INJECTION INTRAMUSCULAR; INTRAVENOUS
Status: DISCONTINUED | OUTPATIENT
Start: 2023-05-23 | End: 2023-05-23 | Stop reason: HOSPADM

## 2023-05-23 RX ORDER — DIPHENHYDRAMINE HYDROCHLORIDE 50 MG/ML
INJECTION INTRAMUSCULAR; INTRAVENOUS
Status: DISCONTINUED | OUTPATIENT
Start: 2023-05-23 | End: 2023-05-23 | Stop reason: HOSPADM

## 2023-05-23 RX ORDER — SODIUM CHLORIDE 9 MG/ML
40 INJECTION, SOLUTION INTRAVENOUS AS NEEDED
Status: DISCONTINUED | OUTPATIENT
Start: 2023-05-23 | End: 2023-05-23 | Stop reason: HOSPADM

## 2023-05-23 RX ORDER — FENTANYL CITRATE 50 UG/ML
INJECTION, SOLUTION INTRAMUSCULAR; INTRAVENOUS
Status: DISCONTINUED | OUTPATIENT
Start: 2023-05-23 | End: 2023-05-23 | Stop reason: HOSPADM

## 2023-05-23 RX ORDER — SODIUM CHLORIDE 0.9 % (FLUSH) 0.9 %
10 SYRINGE (ML) INJECTION EVERY 12 HOURS SCHEDULED
Status: DISCONTINUED | OUTPATIENT
Start: 2023-05-23 | End: 2023-05-23 | Stop reason: HOSPADM

## 2023-05-23 NOTE — Clinical Note
Hemostasis started on the right femoral artery. Manual pressure applied to vessel. Manual pressure was held by [place]. Manual pressure was held for 10 min. Hemostasis achieved successfully.

## 2023-05-23 NOTE — H&P
Chief Complaint: chest pain, abnormal nuclear stress    HPI: Mr East is a 77 year old male with significant past medical history of coronary artery disease status post three-vessel CABG in 2014, hypertension, dyslipidemia, obesity, hypothyroidism, and prostate cancer.  At last office visit on 4/19/2023 he reported chest pain symptoms.  A nuclear stress was ordered for further evaluation.  This was done on 5/17/2023 showing a large size infarct in the lateral wall with evidence of daniel-infarct ischemia with a EF of 40% and hypokinesis of the lateral wall.  Patient was called on 5/19/2023 and further treatment options were discussed.  After explaining the risk, benefits, and alternatives of the treatment options, he elected to proceed with cardiac catheterization which he is here for today.      Patient Active Problem List   Diagnosis    Coronary artery disease of bypass graft of native heart with stable angina pectoris    Essential hypertension    Dyslipidemia    Class 1 obesity due to excess calories with serious comorbidity and body mass index (BMI) of 34.0 to 34.9 in adult    Syncope and collapse    Acquired hypothyroidism    History of prostate cancer    Vertebrobasilar occlusive disease    Abnormal computed tomography angiography (CTA) of neck    Dizziness    Loss of balance    Nystagmus    Dysfunction of both eustachian tubes    Abnormal nuclear stress test       Past Medical History:   Diagnosis Date    CAD (coronary artery disease)     Cancer     Prostate    Chest pain     HLD (hyperlipidemia)     HTN (hypertension)     MI (myocardial infarction)     Ruptured ear drum, right     TIA (transient ischemic attack)     Vertebrobasilar occlusive disease 7/21/2020     Past Surgical History:   Procedure Laterality Date    COLONOSCOPY      CORONARY ANGIOPLASTY WITH STENT PLACEMENT      CORONARY ARTERY BYPASS GRAFT  2014    PROSTATE SURGERY      TONSILLECTOMY         The following portions of the patient's history  "were reviewed and updated as appropriate: allergies, current medications, past family history, past medical history, past social history, past surgical history, and problem list.    Social History     Socioeconomic History    Marital status: Single   Tobacco Use    Smoking status: Never    Smokeless tobacco: Never   Vaping Use    Vaping Use: Never used   Substance and Sexual Activity    Alcohol use: Yes     Alcohol/week: 6.0 standard drinks     Types: 6 Cans of beer per week    Drug use: No    Sexual activity: Defer       Family History   Problem Relation Age of Onset    Heart disease Mother     Lung cancer Father     Cancer Father     No Known Problems Sister     No Known Problems Brother        Review of Systems: A 12 system review of systems was completed and is negative except stated in HPI.     Objective   /77   Pulse 63   Temp 98.3 °F (36.8 °C)   Resp 23   Ht 172.7 cm (68\")   Wt 104 kg (229 lb 4.5 oz)   SpO2 99%   BMI 34.86 kg/m²     Physical Exam:  Constitutional:       Appearance: Well-developed.   HENT:      Head: Normocephalic and atraumatic.   Pulmonary:      Effort: Pulmonary effort is normal.      Breath sounds: Normal breath sounds.   Cardiovascular:      Normal rate. Regular rhythm.   Edema:     Peripheral edema absent.   Skin:     General: Skin is warm and dry.   Neurological:      Mental Status: Alert and oriented to person, place, and time.       Lab Results   Component Value Date    CKMB 0.37 11/24/2014    TROPONINI <0.012 03/25/2018    TROPONINT <0.010 07/20/2020     Lab Results   Component Value Date    GLUCOSE 152 (H) 05/23/2023    CALCIUM 9.3 05/23/2023     05/23/2023    K 4.2 05/23/2023    CO2 25.0 05/23/2023     05/23/2023    BUN 20 05/23/2023    CREATININE 1.00 05/23/2023    EGFRIFNONA 86 07/20/2020    BCR 20.0 05/23/2023    ANIONGAP 7.0 05/23/2023     Lab Results   Component Value Date    WBC 7.62 05/23/2023    HGB 14.6 05/23/2023    HCT 45.6 05/23/2023    MCV 88.2 " 05/23/2023     05/23/2023     Lab Results   Component Value Date    CHOL 150 07/21/2020    CHOL 164 03/25/2018     Lab Results   Component Value Date    TRIG 163 (H) 07/21/2020    TRIG 151 (H) 03/25/2018    TRIG 167 (H) 11/25/2014     Lab Results   Component Value Date    HDL 30 (L) 07/21/2020    HDL 22 (L) 03/25/2018    HDL 24 11/25/2014     No components found for: LDLCALC  Lab Results   Component Value Date    LDL 87 07/21/2020     (H) 03/25/2018    LDL 94 11/25/2014     Nuclear stress (5/17/2023) large sized infarct in the lateral wall with evidence of daniel-infarct ischemia, EF 40%, lateral wall hypokinesis    Assessment:  1.  Coronary artery disease: 3-vessel CABG on 12/1/2014 with LIMA to LAD, SVG to OM, and SVG to PDA.  Persistent chest pain symptoms.  2.  Abnormal nuclear stress: Done on 5/17/2023 with a large size infarct in the lateral wall with daniel-infarct ischemia, EF 40%.  3.  Essential hypertension  4.  Dyslipidemia  5.  Obesity: Body mass index is 34.86 kg/m².     Plan:  -Cardiac catheterization today.

## 2023-08-02 ENCOUNTER — OFFICE VISIT (OUTPATIENT)
Dept: CARDIOLOGY | Facility: CLINIC | Age: 78
End: 2023-08-02
Payer: MEDICARE

## 2023-08-02 VITALS
BODY MASS INDEX: 35.01 KG/M2 | HEIGHT: 68 IN | WEIGHT: 231 LBS | SYSTOLIC BLOOD PRESSURE: 122 MMHG | OXYGEN SATURATION: 98 % | DIASTOLIC BLOOD PRESSURE: 76 MMHG | HEART RATE: 63 BPM

## 2023-08-02 DIAGNOSIS — E78.5 DYSLIPIDEMIA: ICD-10-CM

## 2023-08-02 DIAGNOSIS — I10 ESSENTIAL HYPERTENSION: ICD-10-CM

## 2023-08-02 DIAGNOSIS — I25.708 CORONARY ARTERY DISEASE OF BYPASS GRAFT OF NATIVE HEART WITH STABLE ANGINA PECTORIS: Primary | ICD-10-CM

## 2023-08-02 DIAGNOSIS — R10.13 EPIGASTRIC PAIN: ICD-10-CM

## 2023-08-02 PROBLEM — R94.39 ABNORMAL NUCLEAR STRESS TEST: Status: RESOLVED | Noted: 2023-05-19 | Resolved: 2023-08-02

## 2023-08-02 PROCEDURE — 3078F DIAST BP <80 MM HG: CPT | Performed by: INTERNAL MEDICINE

## 2023-08-02 PROCEDURE — 1159F MED LIST DOCD IN RCRD: CPT | Performed by: INTERNAL MEDICINE

## 2023-08-02 PROCEDURE — 99214 OFFICE O/P EST MOD 30 MIN: CPT | Performed by: INTERNAL MEDICINE

## 2023-08-02 PROCEDURE — 1160F RVW MEDS BY RX/DR IN RCRD: CPT | Performed by: INTERNAL MEDICINE

## 2023-08-02 PROCEDURE — 3074F SYST BP LT 130 MM HG: CPT | Performed by: INTERNAL MEDICINE

## 2024-02-07 ENCOUNTER — OFFICE VISIT (OUTPATIENT)
Dept: CARDIOLOGY | Facility: CLINIC | Age: 79
End: 2024-02-07
Payer: MEDICARE

## 2024-02-07 VITALS
OXYGEN SATURATION: 99 % | SYSTOLIC BLOOD PRESSURE: 132 MMHG | HEIGHT: 68 IN | HEART RATE: 73 BPM | BODY MASS INDEX: 34.25 KG/M2 | WEIGHT: 226 LBS | DIASTOLIC BLOOD PRESSURE: 80 MMHG

## 2024-02-07 DIAGNOSIS — I25.708 CORONARY ARTERY DISEASE OF BYPASS GRAFT OF NATIVE HEART WITH STABLE ANGINA PECTORIS: Primary | ICD-10-CM

## 2024-02-07 DIAGNOSIS — R42 DIZZINESS: ICD-10-CM

## 2024-02-07 DIAGNOSIS — E78.5 DYSLIPIDEMIA: ICD-10-CM

## 2024-02-07 DIAGNOSIS — I10 ESSENTIAL HYPERTENSION: ICD-10-CM

## 2024-02-07 PROCEDURE — 1159F MED LIST DOCD IN RCRD: CPT | Performed by: INTERNAL MEDICINE

## 2024-02-07 PROCEDURE — 93000 ELECTROCARDIOGRAM COMPLETE: CPT | Performed by: INTERNAL MEDICINE

## 2024-02-07 PROCEDURE — 3079F DIAST BP 80-89 MM HG: CPT | Performed by: INTERNAL MEDICINE

## 2024-02-07 PROCEDURE — 1160F RVW MEDS BY RX/DR IN RCRD: CPT | Performed by: INTERNAL MEDICINE

## 2024-02-07 PROCEDURE — 3075F SYST BP GE 130 - 139MM HG: CPT | Performed by: INTERNAL MEDICINE

## 2024-02-07 PROCEDURE — 99214 OFFICE O/P EST MOD 30 MIN: CPT | Performed by: INTERNAL MEDICINE

## 2024-02-07 RX ORDER — ISOSORBIDE MONONITRATE 30 MG/1
30 TABLET, EXTENDED RELEASE ORAL EVERY MORNING
Qty: 30 TABLET | Refills: 11 | Status: SHIPPED | OUTPATIENT
Start: 2024-02-07

## 2024-02-07 NOTE — PROGRESS NOTES
Reason for Visit: cardiovascular follow up.    HPI:  Orlin Mims is a 78 y.o. male is here today for 6-month follow-up.  He continues to have intermittent chest/epigastric pain episodes.  This often happens when he exerts himself.  He is not taking Imdur and is not sure why this isn't on his list anymore.  He had cholecystectomy with Dr. Sanches last year.  He tries to get exercise but is limited by the pain.  He walks in his back field intermittently.  He feels dizzy and lightheaded intermittently.  His blood pressure is usually normal when he feels dizzy.      Previous Cardiac Testing and Procedures:  - LHC (11/25/2014) severe three-vessel CAD  - 3 vessel CABG (12/01/2014) LIMA to LAD, SVG to OM, SVG to PDA  - Nuclear stress from 5/17/2023 showed a lateral wall infarct with possible daniel-infarct ischemia with EF of 40% and lateral wall hypokinesis  -CTA neck (no flow-limiting stenosis along the carotid arteries, hypoplastic right vertebral artery, short segment of high-grade narrowing along the left vertebral artery, together these may reduce flow in the posterior circulation  - [facility] (5/23/2023) native 3-vessel CAD with  proximal LAD, proximal RCA, and OM 3, patent LIMA to LAD, and SVG to PDA,  of SVG to OM, medical therapy recommended     Lab data:  - Lipid panel (3/25/2018) total cholesterol 164, HDL 22, , triglycerides 151  - Lipid panel (7/21/2020) total cholesterol 150, HDL 30, LDL 87, triglycerides 163  - Lipid panel (1/31/2022) total cholesterol 159, HDL 32, , triglycerides 130  - Lipid panel (2/10/2023) total cholesterol 161, HDL 36, LDL 91, triglycerides 130  - Lipid panel (5/23/2023) total cholesterol 123, HDL 31, LDL 74, triglycerides 96  - BMP (5/23/2023) creatinine 1, potassium 4.2, sodium 138    Patient Active Problem List   Diagnosis    Coronary artery disease of bypass graft of native heart with stable angina pectoris    Essential hypertension    Dyslipidemia    Class 1 obesity  due to excess calories with serious comorbidity and body mass index (BMI) of 34.0 to 34.9 in adult    Syncope and collapse    Acquired hypothyroidism    History of prostate cancer    Vertebrobasilar occlusive disease    Abnormal computed tomography angiography (CTA) of neck    Dizziness    Loss of balance    Nystagmus    Dysfunction of both eustachian tubes       Social History     Tobacco Use    Smoking status: Never    Smokeless tobacco: Never   Vaping Use    Vaping Use: Never used   Substance Use Topics    Alcohol use: Yes     Alcohol/week: 6.0 standard drinks of alcohol     Types: 6 Cans of beer per week    Drug use: No       Family History   Problem Relation Age of Onset    Heart disease Mother     Lung cancer Father     Cancer Father     No Known Problems Sister     No Known Problems Brother        The following portions of the patient's history were reviewed and updated as appropriate: allergies, current medications, past family history, past medical history, past social history, past surgical history, and problem list.      Current Outpatient Medications:     aspirin 81 MG EC tablet, Take 1 tablet by mouth Daily., Disp: , Rfl:     atorvastatin (LIPITOR) 80 MG tablet, Take 1 tablet by mouth Every Night., Disp: , Rfl:     carvedilol (COREG) 3.125 MG tablet, Take 1 tablet by mouth 2 (Two) Times a Day., Disp: 60 tablet, Rfl: 11    clopidogrel (PLAVIX) 75 MG tablet, Take 1 tablet by mouth Daily., Disp: 90 tablet, Rfl: 3    ezetimibe (ZETIA) 10 MG tablet, Take 1 tablet by mouth Daily., Disp: 90 tablet, Rfl: 3    isosorbide mononitrate (IMDUR) 30 MG 24 hr tablet, Take 1 tablet by mouth Every Morning., Disp: 30 tablet, Rfl: 11    losartan (COZAAR) 50 MG tablet, Take 1 tablet by mouth Daily., Disp: 90 tablet, Rfl: 3    nitroglycerin (NITROSTAT) 0.4 MG SL tablet, 1 under the tongue as needed for angina, may repeat q5mins for up three doses (Patient not taking: Reported on 8/2/2023), Disp: 25 tablet, Rfl: 11    Review  "of Systems   Constitutional: Negative for chills and fever.   Cardiovascular:  Negative for chest pain and paroxysmal nocturnal dyspnea.   Respiratory:  Negative for cough and shortness of breath.    Skin:  Negative for rash.   Gastrointestinal:  Negative for abdominal pain and heartburn.   Neurological:  Positive for dizziness and light-headedness. Negative for numbness.       Objective   /80 (BP Location: Left arm, Patient Position: Sitting, Cuff Size: Adult)   Pulse 73   Ht 172.7 cm (67.99\")   Wt 103 kg (226 lb)   SpO2 99%   BMI 34.37 kg/m²   Constitutional:       Appearance: Well-developed.   HENT:      Head: Normocephalic and atraumatic.   Pulmonary:      Effort: Pulmonary effort is normal.      Breath sounds: Normal breath sounds.   Cardiovascular:      Normal rate. Regular rhythm.   Edema:     Peripheral edema absent.   Skin:     General: Skin is warm and dry.   Neurological:      Mental Status: Alert and oriented to person, place, and time.         ECG 12 Lead    Date/Time: 2/7/2024 9:52 AM  Performed by: Hector Beckman MD    Authorized by: Hector Beckman MD  Comparison: compared with previous ECG from 4/19/2023  Similar to previous ECG  Rhythm: sinus rhythm  Rate: normal  Conduction: non-specific intraventricular conduction delay  T inversion: V6, V5, III and II  QRS axis: right            ICD-10-CM ICD-9-CM   1. Coronary artery disease of bypass graft of native heart with stable angina pectoris  I25.708 414.05     413.9   2. Essential hypertension  I10 401.9   3. Dyslipidemia  E78.5 272.4   4. Dizziness  R42 780.4         Assessment/Plan:  1.  Coronary artery disease: 3 vessel CABG. [facility] from 5/23/2023 with 3-vessel disease, patent LIMA to LAD and SVG to PDA,  of SVG to OM with no targets for intervention.  Anginal symptoms appear stable.  Reorder Imdur and encouraged him to take it.  Continue aspirin, Plavix, carvedilol, atorvastatin, and nitroglycerin.     2.  Essential hypertension: " Blood pressure is acceptable.  Continue carvedilol and losartan.     3.  Dyslipidemia: Reasonably good control on lipid panel from 5/23/2023.  He has upcoming lipid panel pending with his PCP.  Continue atorvastatin and ezetimibe.     4.  Dizziness: Blood pressure is always normal during dizziness episodes.  He does have abnormal vertebral circulation noted on prior CTA of the neck from 7/20/2020.  He reports that no interventions were possible.

## 2024-02-07 NOTE — LETTER
February 7, 2024     Herminia Mejia MD  [address]  [address] [address]  [place] [address]    Patient: Orlin Mims   YOB: 1945   Date of Visit: 2/7/2024       Dear Herminia Mejia MD    Orlin Mims was in my office today. Below is a copy of my note.    If you have questions, please do not hesitate to call me. I look forward to following Orlin along with you.         Sincerely,        Hector Beckman MD        CC: No Recipients      Reason for Visit: cardiovascular follow up.    HPI:  Orlin Mims is a 78 y.o. male is here today for 6-month follow-up.  He continues to have intermittent chest/epigastric pain episodes.  This often happens when he exerts himself.  He is not taking Imdur and is not sure why this isn't on his list anymore.  He had cholecystectomy with Dr. Sanches last year.  He tries to get exercise but is limited by the pain.  He walks in his back field intermittently.  He feels dizzy and lightheaded intermittently.  His blood pressure is usually normal when he feels dizzy.      Previous Cardiac Testing and Procedures:  - [facility] (11/25/2014) severe three-vessel CAD  - 3 vessel CABG (12/01/2014) LIMA to LAD, SVG to OM, SVG to PDA  - Nuclear stress from 5/17/2023 showed a lateral wall infarct with possible daniel-infarct ischemia with EF of 40% and lateral wall hypokinesis  -CTA neck (no flow-limiting stenosis along the carotid arteries, hypoplastic right vertebral artery, short segment of high-grade narrowing along the left vertebral artery, together these may reduce flow in the posterior circulation  - [facility] (5/23/2023) native 3-vessel CAD with  proximal LAD, proximal RCA, and OM 3, patent LIMA to LAD, and SVG to PDA,  of SVG to OM, medical therapy recommended     Lab data:  - Lipid panel (3/25/2018) total cholesterol 164, HDL 22, , triglycerides 151  - Lipid panel (7/21/2020) total cholesterol 150, HDL 30, LDL 87, triglycerides 163  - Lipid panel (1/31/2022) total cholesterol 159, HDL 32,  , triglycerides 130  - Lipid panel (2/10/2023) total cholesterol 161, HDL 36, LDL 91, triglycerides 130  - Lipid panel (5/23/2023) total cholesterol 123, HDL 31, LDL 74, triglycerides 96  - BMP (5/23/2023) creatinine 1, potassium 4.2, sodium 138    Patient Active Problem List   Diagnosis   • Coronary artery disease of bypass graft of native heart with stable angina pectoris   • Essential hypertension   • Dyslipidemia   • Class 1 obesity due to excess calories with serious comorbidity and body mass index (BMI) of 34.0 to 34.9 in adult   • Syncope and collapse   • Acquired hypothyroidism   • History of prostate cancer   • Vertebrobasilar occlusive disease   • Abnormal computed tomography angiography (CTA) of neck   • Dizziness   • Loss of balance   • Nystagmus   • Dysfunction of both eustachian tubes       Social History     Tobacco Use   • Smoking status: Never   • Smokeless tobacco: Never   Vaping Use   • Vaping Use: Never used   Substance Use Topics   • Alcohol use: Yes     Alcohol/week: 6.0 standard drinks of alcohol     Types: 6 Cans of beer per week   • Drug use: No       Family History   Problem Relation Age of Onset   • Heart disease Mother    • Lung cancer Father    • Cancer Father    • No Known Problems Sister    • No Known Problems Brother        The following portions of the patient's history were reviewed and updated as appropriate: allergies, current medications, past family history, past medical history, past social history, past surgical history, and problem list.      Current Outpatient Medications:   •  aspirin 81 MG EC tablet, Take 1 tablet by mouth Daily., Disp: , Rfl:   •  atorvastatin (LIPITOR) 80 MG tablet, Take 1 tablet by mouth Every Night., Disp: , Rfl:   •  carvedilol (COREG) 3.125 MG tablet, Take 1 tablet by mouth 2 (Two) Times a Day., Disp: 60 tablet, Rfl: 11  •  clopidogrel (PLAVIX) 75 MG tablet, Take 1 tablet by mouth Daily., Disp: 90 tablet, Rfl: 3  •  ezetimibe (ZETIA) 10 MG  "tablet, Take 1 tablet by mouth Daily., Disp: 90 tablet, Rfl: 3  •  isosorbide mononitrate (IMDUR) 30 MG 24 hr tablet, Take 1 tablet by mouth Every Morning., Disp: 30 tablet, Rfl: 11  •  losartan (COZAAR) 50 MG tablet, Take 1 tablet by mouth Daily., Disp: 90 tablet, Rfl: 3  •  nitroglycerin (NITROSTAT) 0.4 MG SL tablet, 1 under the tongue as needed for angina, may repeat q5mins for up three doses (Patient not taking: Reported on 8/2/2023), Disp: 25 tablet, Rfl: 11    Review of Systems   Constitutional: Negative for chills and fever.   Cardiovascular:  Negative for chest pain and paroxysmal nocturnal dyspnea.   Respiratory:  Negative for cough and shortness of breath.    Skin:  Negative for rash.   Gastrointestinal:  Negative for abdominal pain and heartburn.   Neurological:  Positive for dizziness and light-headedness. Negative for numbness.       Objective  /80 (BP Location: Left arm, Patient Position: Sitting, Cuff Size: Adult)   Pulse 73   Ht 172.7 cm (67.99\")   Wt 103 kg (226 lb)   SpO2 99%   BMI 34.37 kg/m²   Constitutional:       Appearance: Well-developed.   HENT:      Head: Normocephalic and atraumatic.   Pulmonary:      Effort: Pulmonary effort is normal.      Breath sounds: Normal breath sounds.   Cardiovascular:      Normal rate. Regular rhythm.   Edema:     Peripheral edema absent.   Skin:     General: Skin is warm and dry.   Neurological:      Mental Status: Alert and oriented to person, place, and time.         ECG 12 Lead    Date/Time: 2/7/2024 9:52 AM  Performed by: Hector Beckman MD    Authorized by: Hector Beckman MD  Comparison: compared with previous ECG from 4/19/2023  Similar to previous ECG  Rhythm: sinus rhythm  Rate: normal  Conduction: non-specific intraventricular conduction delay  T inversion: V6, V5, III and II  QRS axis: right            ICD-10-CM ICD-9-CM   1. Coronary artery disease of bypass graft of native heart with stable angina pectoris  I25.708 414.05     413.9 "   2. Essential hypertension  I10 401.9   3. Dyslipidemia  E78.5 272.4   4. Dizziness  R42 780.4         Assessment/Plan:  1.  Coronary artery disease: 3 vessel CABG. [facility] from 5/23/2023 with 3-vessel disease, patent LIMA to LAD and SVG to PDA,  of SVG to OM with no targets for intervention.  Anginal symptoms appear stable.  Reorder Imdur and encouraged him to take it.  Continue aspirin, Plavix, carvedilol, atorvastatin, and nitroglycerin.     2.  Essential hypertension: Blood pressure is acceptable.  Continue carvedilol and losartan.     3.  Dyslipidemia: Reasonably good control on lipid panel from 5/23/2023.  He has upcoming lipid panel pending with his PCP.  Continue atorvastatin and ezetimibe.     4.  Dizziness: Blood pressure is always normal during dizziness episodes.  He does have abnormal vertebral circulation noted on prior CTA of the neck from 7/20/2020.  He reports that no interventions were possible.

## 2025-03-12 ENCOUNTER — OFFICE VISIT (OUTPATIENT)
Age: 80
End: 2025-03-12
Payer: MEDICARE

## 2025-03-12 VITALS — TEMPERATURE: 97.1 F | HEART RATE: 85 BPM | DIASTOLIC BLOOD PRESSURE: 60 MMHG | SYSTOLIC BLOOD PRESSURE: 105 MMHG

## 2025-03-12 DIAGNOSIS — C44.311 BASAL CELL CARCINOMA (BCC) OF LEFT SIDE OF NOSE: Primary | ICD-10-CM

## 2025-03-12 DIAGNOSIS — Z79.02 ANTIPLATELET OR ANTITHROMBOTIC LONG-TERM USE: ICD-10-CM

## 2025-03-12 RX ORDER — RANOLAZINE 500 MG/1
TABLET, EXTENDED RELEASE ORAL
COMMUNITY
Start: 2025-03-11

## 2025-03-12 NOTE — PROGRESS NOTES
PRIMARY CARE PROVIDER: Herminia Mejia MD  REFERRING PROVIDER: No ref. provider found    Chief Complaint   Patient presents with    Basal Cell Carcinoma     BCC to left proximal  nasal sidewall .  Pt reports spot has been there for about a year.         Subjective   History of Present Illness:  Orlin Mims is a  79 y.o. male who presents for surgical consultation regarding a biopsy-proven basal cell carcinoma of the left nasal sidewall.  Prior to the biopsy, the lesion had the following characteristics:    Quality: growing lesion  Severity: moderate  Duration: 1 year  Timing: constant  Modifying Factors: none  Associated Signs & Symptoms: feels like it is growing towards his eyelid    Derm: Katarina Tolbert    On Aspirin and Plavix - Dr. Beckman    Review of Systems:  Review of Systems   Constitutional:  Negative for chills, fatigue, fever and unexpected weight change.   HENT:  Negative for facial swelling.    Respiratory:  Negative for cough, chest tightness and shortness of breath.    Cardiovascular:  Negative for chest pain.   Musculoskeletal:  Negative for neck pain.   Skin:  Negative for color change.   Neurological:  Negative for facial asymmetry.   Hematological:  Negative for adenopathy. Bruises/bleeds easily.       Past History:  Past Medical History:   Diagnosis Date    CAD (coronary artery disease)     Cancer     Prostate    Chest pain     HLD (hyperlipidemia)     HTN (hypertension)     MI (myocardial infarction)     Ruptured ear drum, right     TIA (transient ischemic attack)     Vertebrobasilar occlusive disease 7/21/2020     Past Surgical History:   Procedure Laterality Date    CARDIAC CATHETERIZATION N/A 05/23/2023    Procedure: Left Heart Cath;  Surgeon: Hector Beckman MD;  Location: [facility] CATH INVASIVE LOCATION;  Service: Cardiology;  Laterality: N/A;    CARDIAC CATHETERIZATION      CHOLECYSTECTOMY  11/2023    DR HESS    COLONOSCOPY      CORONARY ANGIOPLASTY WITH STENT PLACEMENT      CORONARY ARTERY  BYPASS GRAFT  2014    GALLBLADDER SURGERY      PROSTATE SURGERY      TONSILLECTOMY       Family History   Problem Relation Age of Onset    Heart disease Mother     Lung cancer Father     Cancer Father     No Known Problems Sister     No Known Problems Brother      Social History     Tobacco Use    Smoking status: Never    Smokeless tobacco: Never   Vaping Use    Vaping status: Never Used   Substance Use Topics    Alcohol use: Yes     Alcohol/week: 6.0 standard drinks of alcohol     Types: 6 Cans of beer per week    Drug use: No     Allergies:  Patient has no known allergies.    Current Outpatient Medications:     aspirin 81 MG EC tablet, Take 1 tablet by mouth Daily., Disp: , Rfl:     atorvastatin (LIPITOR) 80 MG tablet, Take 1 tablet by mouth Every Night., Disp: , Rfl:     carvedilol (COREG) 3.125 MG tablet, Take 1 tablet by mouth 2 (Two) Times a Day., Disp: 60 tablet, Rfl: 11    clopidogrel (PLAVIX) 75 MG tablet, Take 1 tablet by mouth Daily., Disp: 90 tablet, Rfl: 3    ezetimibe (ZETIA) 10 MG tablet, Take 1 tablet by mouth Daily., Disp: 90 tablet, Rfl: 3    isosorbide mononitrate (IMDUR) 30 MG 24 hr tablet, Take 1 tablet by mouth Every Morning., Disp: 30 tablet, Rfl: 11    losartan (COZAAR) 50 MG tablet, Take 1 tablet by mouth Daily., Disp: 90 tablet, Rfl: 3    nitroglycerin (NITROSTAT) 0.4 MG SL tablet, 1 under the tongue as needed for angina, may repeat q5mins for up three doses, Disp: 25 tablet, Rfl: 11    ranolazine (RANEXA) 500 MG 12 hr tablet, , Disp: , Rfl:     Objective     Vital Signs:  Temp:  [97.1 °F (36.2 °C)] 97.1 °F (36.2 °C)  Heart Rate:  [85] 85  BP: (105)/(60) 105/60    Physical Exam:  Physical Exam  Vitals and nursing note reviewed.   Constitutional:       General: He is not in acute distress.     Appearance: He is well-developed. He is not diaphoretic.   HENT:      Head: Normocephalic and atraumatic.        Right Ear: External ear normal.      Left Ear: External ear normal.      Nose: Nose  normal.   Eyes:      General: No scleral icterus.        Right eye: No discharge.         Left eye: No discharge.      Conjunctiva/sclera: Conjunctivae normal.      Pupils: Pupils are equal, round, and reactive to light.   Neck:      Thyroid: No thyromegaly.      Vascular: No JVD.      Trachea: No tracheal deviation.   Pulmonary:      Effort: Pulmonary effort is normal.      Breath sounds: No stridor.   Musculoskeletal:         General: No deformity. Normal range of motion.      Cervical back: Normal range of motion and neck supple.   Lymphadenopathy:      Cervical: No cervical adenopathy.   Skin:     General: Skin is warm and dry.      Coloration: Skin is not pale.      Findings: No erythema or rash.   Neurological:      Mental Status: He is alert and oriented to person, place, and time.      Cranial Nerves: No cranial nerve deficit.      Coordination: Coordination normal.   Psychiatric:         Speech: Speech normal.         Behavior: Behavior normal. Behavior is cooperative.         Thought Content: Thought content normal.         Judgment: Judgment normal.         Data Review:        Operative plan:    Full-thickness skin graft versus rotational cheek advancement flap    Assessment   1. Basal cell carcinoma (BCC) of left side of nose    2. Antiplatelet or antithrombotic long-term use        Plan     I have offered excision of the basal cell carcinoma of the left nasal sidewall with likely   full-thickness skin graft versus rotational cheek advancement flap  closure in the operating room under anesthesia.    Will get an MRI of the orbits.    Discussion of skin lesion. Discussed risks, benefits, alternatives, and possible complications of excision of the skin lesion with reconstruction utilizing local tissue rearrangement, full-thickness skin grafting, or local interpolated flaps. Risks include, but are not limited too: bleeding, infection, hematoma, recurrence, need for additional procedures, flap failure,  cosmetic deformity. Patient understands risks and would like to proceed with surgery.     We will check with Dr. KAREN marshall [facility]  to see if he can come off his blood thinner    My findings and recommendations were discussed and questions were answered.     We have discussed treatment options for basal cell carcinomas.  These options include:   1) Curettage and electrodesiccation (Scraping and burning cancer cells)  2) Standard surgical resection  3) Mohs excision  4) Oral drug therapy/chemotherapy  5) Radiation therapy  6) Observation      Cory Hannon MD  03/12/25  11:23 CDT

## 2025-03-12 NOTE — LETTER
March 12, 2025     Herminia Mejia MD  [address]  [address] [address]  [facility] [address]    Patient: Orlin Mims   YOB: 1945   Date of Visit: 3/12/2025     Dear Herminia Mejia MD:       Thank you for referring Orlin Mims to me for evaluation. Below are the relevant portions of my assessment and plan of care.    If you have questions, please do not hesitate to call me. I look forward to following Orlin along with you.         Sincerely,        Cory Hannon MD        CC: DAREN Rodriges John A, MD  03/12/25 1134  Sign when Signing Visit  PRIMARY CARE PROVIDER: Herminia Mejia MD  REFERRING PROVIDER: No ref. provider found    Chief Complaint   Patient presents with   • Basal Cell Carcinoma     BCC to left proximal  nasal sidewall .  Pt reports spot has been there for about a year.         Subjective  History of Present Illness:  Orlin Mims is a  79 y.o. male who presents for surgical consultation regarding a biopsy-proven basal cell carcinoma of the left nasal sidewall.  Prior to the biopsy, the lesion had the following characteristics:    Quality: growing lesion  Severity: moderate  Duration: 1 year  Timing: constant  Modifying Factors: none  Associated Signs & Symptoms: feels like it is growing towards his eyelid    Derm: Katarina Tolbert    On Aspirin and Plavix - Dr. Beckman    Review of Systems:  Review of Systems   Constitutional:  Negative for chills, fatigue, fever and unexpected weight change.   HENT:  Negative for facial swelling.    Respiratory:  Negative for cough, chest tightness and shortness of breath.    Cardiovascular:  Negative for chest pain.   Musculoskeletal:  Negative for neck pain.   Skin:  Negative for color change.   Neurological:  Negative for facial asymmetry.   Hematological:  Negative for adenopathy. Bruises/bleeds easily.       Past History:  Past Medical History:   Diagnosis Date   • CAD (coronary artery disease)    • Cancer     Prostate   • Chest pain    • HLD  (hyperlipidemia)    • HTN (hypertension)    • MI (myocardial infarction)    • Ruptured ear drum, right    • TIA (transient ischemic attack)    • Vertebrobasilar occlusive disease 7/21/2020     Past Surgical History:   Procedure Laterality Date   • CARDIAC CATHETERIZATION N/A 05/23/2023    Procedure: Left Heart Cath;  Surgeon: Hector Beckman MD;  Location:  [facility] CATH INVASIVE LOCATION;  Service: Cardiology;  Laterality: N/A;   • CARDIAC CATHETERIZATION     • CHOLECYSTECTOMY  11/2023    DR HESS   • COLONOSCOPY     • CORONARY ANGIOPLASTY WITH STENT PLACEMENT     • CORONARY ARTERY BYPASS GRAFT  2014   • GALLBLADDER SURGERY     • PROSTATE SURGERY     • TONSILLECTOMY       Family History   Problem Relation Age of Onset   • Heart disease Mother    • Lung cancer Father    • Cancer Father    • No Known Problems Sister    • No Known Problems Brother      Social History     Tobacco Use   • Smoking status: Never   • Smokeless tobacco: Never   Vaping Use   • Vaping status: Never Used   Substance Use Topics   • Alcohol use: Yes     Alcohol/week: 6.0 standard drinks of alcohol     Types: 6 Cans of beer per week   • Drug use: No     Allergies:  Patient has no known allergies.    Current Outpatient Medications:   •  aspirin 81 MG EC tablet, Take 1 tablet by mouth Daily., Disp: , Rfl:   •  atorvastatin (LIPITOR) 80 MG tablet, Take 1 tablet by mouth Every Night., Disp: , Rfl:   •  carvedilol (COREG) 3.125 MG tablet, Take 1 tablet by mouth 2 (Two) Times a Day., Disp: 60 tablet, Rfl: 11  •  clopidogrel (PLAVIX) 75 MG tablet, Take 1 tablet by mouth Daily., Disp: 90 tablet, Rfl: 3  •  ezetimibe (ZETIA) 10 MG tablet, Take 1 tablet by mouth Daily., Disp: 90 tablet, Rfl: 3  •  isosorbide mononitrate (IMDUR) 30 MG 24 hr tablet, Take 1 tablet by mouth Every Morning., Disp: 30 tablet, Rfl: 11  •  losartan (COZAAR) 50 MG tablet, Take 1 tablet by mouth Daily., Disp: 90 tablet, Rfl: 3  •  nitroglycerin (NITROSTAT) 0.4 MG SL tablet, 1 under the  tongue as needed for angina, may repeat q5mins for up three doses, Disp: 25 tablet, Rfl: 11  •  ranolazine (RANEXA) 500 MG 12 hr tablet, , Disp: , Rfl:     Objective    Vital Signs:  Temp:  [97.1 °F (36.2 °C)] 97.1 °F (36.2 °C)  Heart Rate:  [85] 85  BP: (105)/(60) 105/60    Physical Exam:  Physical Exam  Vitals and nursing note reviewed.   Constitutional:       General: He is not in acute distress.     Appearance: He is well-developed. He is not diaphoretic.   HENT:      Head: Normocephalic and atraumatic.        Right Ear: External ear normal.      Left Ear: External ear normal.      Nose: Nose normal.   Eyes:      General: No scleral icterus.        Right eye: No discharge.         Left eye: No discharge.      Conjunctiva/sclera: Conjunctivae normal.      Pupils: Pupils are equal, round, and reactive to light.   Neck:      Thyroid: No thyromegaly.      Vascular: No JVD.      Trachea: No tracheal deviation.   Pulmonary:      Effort: Pulmonary effort is normal.      Breath sounds: No stridor.   Musculoskeletal:         General: No deformity. Normal range of motion.      Cervical back: Normal range of motion and neck supple.   Lymphadenopathy:      Cervical: No cervical adenopathy.   Skin:     General: Skin is warm and dry.      Coloration: Skin is not pale.      Findings: No erythema or rash.   Neurological:      Mental Status: He is alert and oriented to person, place, and time.      Cranial Nerves: No cranial nerve deficit.      Coordination: Coordination normal.   Psychiatric:         Speech: Speech normal.         Behavior: Behavior normal. Behavior is cooperative.         Thought Content: Thought content normal.         Judgment: Judgment normal.         Data Review:        Operative plan:    Full-thickness skin graft versus rotational cheek advancement flap    Assessment  1. Basal cell carcinoma (BCC) of left side of nose    2. Antiplatelet or antithrombotic long-term use        Plan    I have offered  excision of the basal cell carcinoma of the left nasal sidewall with likely   full-thickness skin graft versus rotational cheek advancement flap  closure in the operating room under anesthesia.    Will get an MRI of the orbits.    Discussion of skin lesion. Discussed risks, benefits, alternatives, and possible complications of excision of the skin lesion with reconstruction utilizing local tissue rearrangement, full-thickness skin grafting, or local interpolated flaps. Risks include, but are not limited too: bleeding, infection, hematoma, recurrence, need for additional procedures, flap failure, cosmetic deformity. Patient understands risks and would like to proceed with surgery.     We will check with Dr. KAREN marshall [facility]  to see if he can come off his blood thinner    My findings and recommendations were discussed and questions were answered.     We have discussed treatment options for basal cell carcinomas.  These options include:   1) Curettage and electrodesiccation (Scraping and burning cancer cells)  2) Standard surgical resection  3) Mohs excision  4) Oral drug therapy/chemotherapy  5) Radiation therapy  6) Observation      Cory Hannon MD  03/12/25  11:23 CDT

## 2025-03-13 ENCOUNTER — TELEPHONE (OUTPATIENT)
Age: 80
End: 2025-03-13
Payer: MEDICARE

## 2025-03-14 ENCOUNTER — PATIENT ROUNDING (BHMG ONLY) (OUTPATIENT)
Age: 80
End: 2025-03-14
Payer: MEDICARE

## 2025-03-14 NOTE — PROGRESS NOTES
March 14, 2025    Hello, may I speak with Orlin Mims?    My name is Virgilio Cope       I am  with [facility]  [facility]  [address] [facility] [address]  [facility] [address] [phone].    Before we get started may I verify your date of birth? 1945    I am calling to officially welcome you to our practice and ask about your recent visit. Is this a good time to talk? yes    Tell me about your visit with us. What things went well?  Very nice visit and friendly staff.       We're always looking for ways to make our patients' experiences even better. Do you have recommendations on ways we may improve?  no    Overall were you satisfied with your first visit to our practice? yes       I appreciate you taking the time to speak with me today. Is there anything else I can do for you? no      Thank you, and have a great day.

## 2025-03-20 ENCOUNTER — TELEPHONE (OUTPATIENT)
Age: 80
End: 2025-03-20
Payer: MEDICARE

## 2025-03-20 NOTE — TELEPHONE ENCOUNTER
Patient has been cleared to hold his Plavix 5 days and 3 days post-op surgery, he is to stay on his Aspirin 81. Patient has been contacted with information on time to start to holding.

## 2025-04-01 ENCOUNTER — HOSPITAL ENCOUNTER (OUTPATIENT)
Dept: MRI IMAGING | Facility: HOSPITAL | Age: 80
Discharge: HOME | End: 2025-04-01
Admitting: OTOLARYNGOLOGY
Payer: MEDICARE

## 2025-04-01 DIAGNOSIS — C44.311 BASAL CELL CARCINOMA (BCC) OF LEFT SIDE OF NOSE: ICD-10-CM

## 2025-04-01 LAB — CREAT BLDA-MCNC: 1.4 MG/DL (ref 0.6–1.3)

## 2025-04-01 PROCEDURE — 82565 ASSAY OF CREATININE: CPT

## 2025-04-01 PROCEDURE — 70543 MRI ORBT/FAC/NCK W/O &W/DYE: CPT

## 2025-04-01 PROCEDURE — 25510000001 GADOPICLENOL 0.5 MMOL/ML SOLUTION: Performed by: OTOLARYNGOLOGY

## 2025-04-01 PROCEDURE — A9579 GAD-BASE MR CONTRAST NOS,1ML: HCPCS | Performed by: OTOLARYNGOLOGY

## 2025-04-01 RX ADMIN — GADOPICLENOL 10 ML: 485.1 INJECTION INTRAVENOUS at 13:15

## 2025-04-02 ENCOUNTER — TELEPHONE (OUTPATIENT)
Age: 80
End: 2025-04-02
Payer: MEDICARE

## 2025-04-02 NOTE — TELEPHONE ENCOUNTER
I called and discussed that his MRI did not show any evidence of intraorbital extension of his basal cell carcinoma of the sidewall.  There is no answer.  We will reach back out.        Electronically signed by Cory Hannon MD, 04/02/25, 9:51 AM CDT.

## 2025-04-03 ENCOUNTER — TELEPHONE (OUTPATIENT)
Age: 80
End: 2025-04-03
Payer: MEDICARE

## 2025-05-20 ENCOUNTER — PRE-ADMISSION TESTING (OUTPATIENT)
Dept: PREADMISSION TESTING | Facility: HOSPITAL | Age: 80
End: 2025-05-20
Payer: MEDICARE

## 2025-05-20 VITALS
SYSTOLIC BLOOD PRESSURE: 144 MMHG | RESPIRATION RATE: 20 BRPM | DIASTOLIC BLOOD PRESSURE: 73 MMHG | BODY MASS INDEX: 32.78 KG/M2 | HEIGHT: 68 IN | OXYGEN SATURATION: 97 % | WEIGHT: 216.27 LBS | HEART RATE: 63 BPM

## 2025-05-20 LAB
ANION GAP SERPL CALCULATED.3IONS-SCNC: 9 MMOL/L (ref 5–15)
BUN SERPL-MCNC: 17 MG/DL (ref 8–23)
BUN/CREAT SERPL: 17 (ref 7–25)
CALCIUM SPEC-SCNC: 9.2 MG/DL (ref 8.6–10.5)
CHLORIDE SERPL-SCNC: 102 MMOL/L (ref 98–107)
CO2 SERPL-SCNC: 26 MMOL/L (ref 22–29)
CREAT SERPL-MCNC: 1 MG/DL (ref 0.76–1.27)
DEPRECATED RDW RBC AUTO: 40.2 FL (ref 37–54)
EGFRCR SERPLBLD CKD-EPI 2021: 77.5 ML/MIN/1.73
ERYTHROCYTE [DISTWIDTH] IN BLOOD BY AUTOMATED COUNT: 13.1 % (ref 12.3–15.4)
GLUCOSE SERPL-MCNC: 299 MG/DL (ref 65–99)
HCT VFR BLD AUTO: 41.5 % (ref 37.5–51)
HGB BLD-MCNC: 13.8 G/DL (ref 13–17.7)
MCH RBC QN AUTO: 28.1 PG (ref 26.6–33)
MCHC RBC AUTO-ENTMCNC: 33.3 G/DL (ref 31.5–35.7)
MCV RBC AUTO: 84.5 FL (ref 79–97)
PLATELET # BLD AUTO: 225 10*3/MM3 (ref 140–450)
PMV BLD AUTO: 10.5 FL (ref 6–12)
POTASSIUM SERPL-SCNC: 4.4 MMOL/L (ref 3.5–5.2)
RBC # BLD AUTO: 4.91 10*6/MM3 (ref 4.14–5.8)
SODIUM SERPL-SCNC: 137 MMOL/L (ref 136–145)
WBC NRBC COR # BLD AUTO: 7.01 10*3/MM3 (ref 3.4–10.8)

## 2025-05-20 PROCEDURE — 80048 BASIC METABOLIC PNL TOTAL CA: CPT

## 2025-05-20 PROCEDURE — 36415 COLL VENOUS BLD VENIPUNCTURE: CPT

## 2025-05-20 PROCEDURE — 85027 COMPLETE CBC AUTOMATED: CPT

## 2025-05-20 RX ORDER — OMEPRAZOLE 40 MG/1
40 CAPSULE, DELAYED RELEASE ORAL DAILY
COMMUNITY

## 2025-05-20 NOTE — DISCHARGE INSTRUCTIONS
Preparing for Surgery  Follow these instructions before the procedure:  Several days or weeks before your procedure  Medication(s) you need to stop   _______ days/week prior to surgery: ***      Ask your health care provider about:  Changing or stopping your regular medicines. This is especially important if you are taking diabetes medicines or blood thinners.  Taking medicines such as aspirin and ibuprofen. These medicines can thin your blood. Do not take these medicines unless your health care provider tells you to take them.  Taking over-the-counter medicines, vitamins, herbs, and supplements.    Contact your surgeon if you:  Develop a fever of more than 100.4°F (38°C) or other feelings of illness during the 48 hours before your surgery.  Have symptoms that get worse.  Have questions or concerns about your surgery.  If you are going home the same day of your surgery you will need to arrange for a responsible adult, age 18 years old or older, to drive you home from the hospital and stay with you for 24 hours. Verification of the  will be made prior to any procedure requiring sedation. You may not go home in a taxi or any form of public transportation by yourself.     Day before your procedure  Medication(s) you need to stop the day before your surgery:losartan,     24 hours before your procedure DO NOT drink alcoholic beverages or smoke.  24 hours before your procedure STOP taking Erectile Dysfunction medication (i.e.,Cialis, Viagra)   You may be asked to shower with a germ-killing soap.  Day of your procedure   You may take the following medication(s) the morning of surgery with a sip of water: coreg      8 hours before your scheduled arrival time, STOP all food, any dairy products, and full liquids. This includes hard candy, chewing gum or mints. This is extremely important to prevent serious complications.     Up to 2 hours before your scheduled arrival time, you may have clear liquids no cream, powder,  or pulp of any kind. Safe options are water, black coffee, plain tea, soda, Gatorade/Powerade, clear broth, apple juice.    2 hours before your scheduled arrival time, STOP drinking clear liquids.    You may need to take another shower with a germ-killing soap before you leave home in the morning. Do not use perfumes, colognes, or body lotions.  Wear comfortable loose-fitting clothing.  Remove all jewelry including body piercing and rings, dark colored nail polish, and make up prior to arrival at the hospital. Leave all valuables at home.   Bring your hearing aids if you rely on them.  Do not wear contact lenses. If you wear eyeglasses remember to bring a case to store them in while you are in surgery.  Do not use denture adhesives since you will be asked to remove them during your surgery.    You do not need to bring your home medications into the hospital.   Bring your sleep apnea device with you on the day of your surgery (if this applies to you).  If you have an Inspire implant for sleep apnea, please bring the remote with you on the day of surgery.  If you wear portable oxygen, bring it with you.   If you are staying overnight, you may bring a bag of items you may need such as slippers, robe and a change of clothes for your discharge. You may want to leave these items in the car until you are ready for them since your family will take your belongings when you leave the pre-operative area.  Arrive at the hospital as scheduled by the office. You will be asked to arrive 2 hours prior to your surgery time in order to prepare for your procedure.  When you arrive at the hospital  Go to the registration desk located at the main entrance of the hospital.  After registration is completed, you will be given a beeper and a sticker sheet. Take the stickers to Outpatient Surgery and place in the tray at the end of the desk to notify the staff that you have arrived and registered.   Return to the lobby to wait. You are not  always called back according to the time of arrival but rather the time your doctor will be ready.  When your beeper lights up and vibrates proceed through the double doors, under the stairs, and a member of the Outpatient Surgery staff will escort you to your preoperative room.

## 2025-05-23 ENCOUNTER — TELEPHONE (OUTPATIENT)
Dept: OTOLARYNGOLOGY | Facility: CLINIC | Age: 80
End: 2025-05-23
Payer: MEDICARE

## 2025-05-27 ENCOUNTER — ANESTHESIA EVENT (OUTPATIENT)
Dept: PERIOP | Facility: HOSPITAL | Age: 80
End: 2025-05-27
Payer: MEDICARE

## 2025-05-27 ENCOUNTER — HOSPITAL ENCOUNTER (OUTPATIENT)
Facility: HOSPITAL | Age: 80
Setting detail: HOSPITAL OUTPATIENT SURGERY
Discharge: HOME | End: 2025-05-27
Attending: OTOLARYNGOLOGY | Admitting: OTOLARYNGOLOGY
Payer: MEDICARE

## 2025-05-27 ENCOUNTER — ANESTHESIA (OUTPATIENT)
Dept: PERIOP | Facility: HOSPITAL | Age: 80
End: 2025-05-27
Payer: MEDICARE

## 2025-05-27 VITALS
OXYGEN SATURATION: 96 % | RESPIRATION RATE: 16 BRPM | SYSTOLIC BLOOD PRESSURE: 136 MMHG | TEMPERATURE: 97.3 F | HEART RATE: 56 BPM | DIASTOLIC BLOOD PRESSURE: 67 MMHG

## 2025-05-27 DIAGNOSIS — C44.311 BASAL CELL CARCINOMA (BCC) OF LEFT SIDE OF NOSE: ICD-10-CM

## 2025-05-27 LAB
GLUCOSE BLDC GLUCOMTR-MCNC: 220 MG/DL (ref 70–130)
GLUCOSE BLDC GLUCOMTR-MCNC: 221 MG/DL (ref 70–130)
GLUCOSE BLDC GLUCOMTR-MCNC: 354 MG/DL (ref 70–130)
QT INTERVAL: 440 MS
QTC INTERVAL: 431 MS

## 2025-05-27 PROCEDURE — 88331 PATH CONSLTJ SURG 1 BLK 1SPC: CPT | Performed by: OTOLARYNGOLOGY

## 2025-05-27 PROCEDURE — 88305 TISSUE EXAM BY PATHOLOGIST: CPT | Performed by: OTOLARYNGOLOGY

## 2025-05-27 PROCEDURE — 25810000003 LACTATED RINGERS PER 1000 ML: Performed by: OTOLARYNGOLOGY

## 2025-05-27 PROCEDURE — 15260 FTH/GFT FR N/E/E/L 20 SQCM/<: CPT | Performed by: OTOLARYNGOLOGY

## 2025-05-27 PROCEDURE — 25010000002 FENTANYL CITRATE (PF) 100 MCG/2ML SOLUTION: Performed by: NURSE ANESTHETIST, CERTIFIED REGISTERED

## 2025-05-27 PROCEDURE — 25010000002 ONDANSETRON PER 1 MG: Performed by: NURSE ANESTHETIST, CERTIFIED REGISTERED

## 2025-05-27 PROCEDURE — 11643 EXC F/E/E/N/L MAL+MRG 2.1-3: CPT | Performed by: OTOLARYNGOLOGY

## 2025-05-27 PROCEDURE — 25010000002 LIDOCAINE PF 2% 2 % SOLUTION: Performed by: NURSE ANESTHETIST, CERTIFIED REGISTERED

## 2025-05-27 PROCEDURE — 25010000002 PROPOFOL 10 MG/ML EMULSION: Performed by: NURSE ANESTHETIST, CERTIFIED REGISTERED

## 2025-05-27 PROCEDURE — 82948 REAGENT STRIP/BLOOD GLUCOSE: CPT

## 2025-05-27 PROCEDURE — 88332 PATH CONSLTJ SURG EA ADD BLK: CPT | Performed by: OTOLARYNGOLOGY

## 2025-05-27 PROCEDURE — 25010000002 CEFAZOLIN PER 500 MG: Performed by: OTOLARYNGOLOGY

## 2025-05-27 PROCEDURE — 93005 ELECTROCARDIOGRAM TRACING: CPT | Performed by: OTOLARYNGOLOGY

## 2025-05-27 PROCEDURE — 25010000002 LIDOCAINE 1% - EPINEPHRINE 1:100000 1 %-1:100000 SOLUTION 20 ML VIAL: Performed by: OTOLARYNGOLOGY

## 2025-05-27 PROCEDURE — 63710000001 INSULIN REGULAR HUMAN PER 5 UNITS: Performed by: ANESTHESIOLOGY

## 2025-05-27 PROCEDURE — 25010000002 MIDAZOLAM PER 1MG: Performed by: ANESTHESIOLOGY

## 2025-05-27 PROCEDURE — 25010000002 SUGAMMADEX 200 MG/2ML SOLUTION: Performed by: NURSE ANESTHETIST, CERTIFIED REGISTERED

## 2025-05-27 RX ORDER — SODIUM CHLORIDE 0.9 % (FLUSH) 0.9 %
3-10 SYRINGE (ML) INJECTION AS NEEDED
Status: DISCONTINUED | OUTPATIENT
Start: 2025-05-27 | End: 2025-05-27 | Stop reason: HOSPADM

## 2025-05-27 RX ORDER — HYDROCODONE BITARTRATE AND ACETAMINOPHEN 5; 325 MG/1; MG/1
1 TABLET ORAL EVERY 6 HOURS PRN
Qty: 12 TABLET | Refills: 0 | Status: SHIPPED | OUTPATIENT
Start: 2025-05-27 | End: 2025-05-30

## 2025-05-27 RX ORDER — ACETAMINOPHEN 500 MG
1000 TABLET ORAL ONCE
Status: COMPLETED | OUTPATIENT
Start: 2025-05-27 | End: 2025-05-27

## 2025-05-27 RX ORDER — MUPIROCIN 20 MG/G
OINTMENT TOPICAL 4 TIMES DAILY
Status: DISCONTINUED | OUTPATIENT
Start: 2025-05-27 | End: 2025-05-27 | Stop reason: HOSPADM

## 2025-05-27 RX ORDER — ROCURONIUM BROMIDE 10 MG/ML
INJECTION, SOLUTION INTRAVENOUS AS NEEDED
Status: DISCONTINUED | OUTPATIENT
Start: 2025-05-27 | End: 2025-05-27 | Stop reason: SURG

## 2025-05-27 RX ORDER — HYDROCODONE BITARTRATE AND ACETAMINOPHEN 5; 325 MG/1; MG/1
1 TABLET ORAL EVERY 4 HOURS PRN
Status: DISCONTINUED | OUTPATIENT
Start: 2025-05-27 | End: 2025-05-27 | Stop reason: HOSPADM

## 2025-05-27 RX ORDER — SODIUM CHLORIDE 0.9 % (FLUSH) 0.9 %
3 SYRINGE (ML) INJECTION AS NEEDED
Status: DISCONTINUED | OUTPATIENT
Start: 2025-05-27 | End: 2025-05-27 | Stop reason: HOSPADM

## 2025-05-27 RX ORDER — MIDAZOLAM HYDROCHLORIDE 2 MG/2ML
0.5 INJECTION, SOLUTION INTRAMUSCULAR; INTRAVENOUS
Status: DISCONTINUED | OUTPATIENT
Start: 2025-05-27 | End: 2025-05-27 | Stop reason: HOSPADM

## 2025-05-27 RX ORDER — HYDROCODONE BITARTRATE AND ACETAMINOPHEN 5; 325 MG/1; MG/1
1 TABLET ORAL ONCE AS NEEDED
Refills: 0 | Status: DISCONTINUED | OUTPATIENT
Start: 2025-05-27 | End: 2025-05-27 | Stop reason: HOSPADM

## 2025-05-27 RX ORDER — LIDOCAINE HYDROCHLORIDE 20 MG/ML
INJECTION, SOLUTION EPIDURAL; INFILTRATION; INTRACAUDAL; PERINEURAL AS NEEDED
Status: DISCONTINUED | OUTPATIENT
Start: 2025-05-27 | End: 2025-05-27 | Stop reason: SURG

## 2025-05-27 RX ORDER — SODIUM CHLORIDE 9 MG/ML
40 INJECTION, SOLUTION INTRAVENOUS AS NEEDED
Status: DISCONTINUED | OUTPATIENT
Start: 2025-05-27 | End: 2025-05-27 | Stop reason: HOSPADM

## 2025-05-27 RX ORDER — ONDANSETRON 2 MG/ML
4 INJECTION INTRAMUSCULAR; INTRAVENOUS ONCE AS NEEDED
Status: DISCONTINUED | OUTPATIENT
Start: 2025-05-27 | End: 2025-05-27 | Stop reason: HOSPADM

## 2025-05-27 RX ORDER — LIDOCAINE HYDROCHLORIDE 10 MG/ML
0.5 INJECTION, SOLUTION EPIDURAL; INFILTRATION; INTRACAUDAL; PERINEURAL ONCE AS NEEDED
Status: DISCONTINUED | OUTPATIENT
Start: 2025-05-27 | End: 2025-05-27 | Stop reason: HOSPADM

## 2025-05-27 RX ORDER — SODIUM CHLORIDE 0.9 % (FLUSH) 0.9 %
3 SYRINGE (ML) INJECTION EVERY 12 HOURS SCHEDULED
Status: DISCONTINUED | OUTPATIENT
Start: 2025-05-27 | End: 2025-05-27 | Stop reason: HOSPADM

## 2025-05-27 RX ORDER — SODIUM CHLORIDE, SODIUM LACTATE, POTASSIUM CHLORIDE, CALCIUM CHLORIDE 600; 310; 30; 20 MG/100ML; MG/100ML; MG/100ML; MG/100ML
100 INJECTION, SOLUTION INTRAVENOUS CONTINUOUS
Status: DISCONTINUED | OUTPATIENT
Start: 2025-05-27 | End: 2025-05-27 | Stop reason: HOSPADM

## 2025-05-27 RX ORDER — LABETALOL HYDROCHLORIDE 5 MG/ML
5 INJECTION, SOLUTION INTRAVENOUS
Status: DISCONTINUED | OUTPATIENT
Start: 2025-05-27 | End: 2025-05-27 | Stop reason: HOSPADM

## 2025-05-27 RX ORDER — BALANCED SALT SOLUTION ENRICHED WITH BICARBONATE, DEXTROSE, AND GLUTATHIONE
KIT INTRAOCULAR AS NEEDED
Status: DISCONTINUED | OUTPATIENT
Start: 2025-05-27 | End: 2025-05-27 | Stop reason: HOSPADM

## 2025-05-27 RX ORDER — IBUPROFEN 600 MG/1
600 TABLET, FILM COATED ORAL EVERY 6 HOURS PRN
Status: DISCONTINUED | OUTPATIENT
Start: 2025-05-27 | End: 2025-05-27 | Stop reason: HOSPADM

## 2025-05-27 RX ORDER — ONDANSETRON 2 MG/ML
INJECTION INTRAMUSCULAR; INTRAVENOUS AS NEEDED
Status: DISCONTINUED | OUTPATIENT
Start: 2025-05-27 | End: 2025-05-27 | Stop reason: SURG

## 2025-05-27 RX ORDER — HYDROCODONE BITARTRATE AND ACETAMINOPHEN 10; 325 MG/1; MG/1
1 TABLET ORAL EVERY 4 HOURS PRN
Status: DISCONTINUED | OUTPATIENT
Start: 2025-05-27 | End: 2025-05-27 | Stop reason: HOSPADM

## 2025-05-27 RX ORDER — SODIUM CHLORIDE, SODIUM LACTATE, POTASSIUM CHLORIDE, CALCIUM CHLORIDE 600; 310; 30; 20 MG/100ML; MG/100ML; MG/100ML; MG/100ML
1000 INJECTION, SOLUTION INTRAVENOUS CONTINUOUS
Status: DISCONTINUED | OUTPATIENT
Start: 2025-05-27 | End: 2025-05-27 | Stop reason: HOSPADM

## 2025-05-27 RX ORDER — FENTANYL CITRATE 50 UG/ML
50 INJECTION, SOLUTION INTRAMUSCULAR; INTRAVENOUS
Status: DISCONTINUED | OUTPATIENT
Start: 2025-05-27 | End: 2025-05-27 | Stop reason: HOSPADM

## 2025-05-27 RX ORDER — LIDOCAINE HYDROCHLORIDE 40 MG/ML
SOLUTION TOPICAL AS NEEDED
Status: DISCONTINUED | OUTPATIENT
Start: 2025-05-27 | End: 2025-05-27 | Stop reason: SURG

## 2025-05-27 RX ORDER — FLUMAZENIL 0.1 MG/ML
0.2 INJECTION INTRAVENOUS AS NEEDED
Status: DISCONTINUED | OUTPATIENT
Start: 2025-05-27 | End: 2025-05-27 | Stop reason: HOSPADM

## 2025-05-27 RX ORDER — NALOXONE HCL 0.4 MG/ML
0.4 VIAL (ML) INJECTION AS NEEDED
Status: DISCONTINUED | OUTPATIENT
Start: 2025-05-27 | End: 2025-05-27 | Stop reason: HOSPADM

## 2025-05-27 RX ORDER — FENTANYL CITRATE 50 UG/ML
INJECTION, SOLUTION INTRAMUSCULAR; INTRAVENOUS AS NEEDED
Status: DISCONTINUED | OUTPATIENT
Start: 2025-05-27 | End: 2025-05-27 | Stop reason: SURG

## 2025-05-27 RX ORDER — DOXYCYCLINE 100 MG/1
100 CAPSULE ORAL 2 TIMES DAILY
Qty: 28 CAPSULE | Refills: 0 | Status: SHIPPED | OUTPATIENT
Start: 2025-05-27 | End: 2025-06-10

## 2025-05-27 RX ORDER — PROPOFOL 10 MG/ML
VIAL (ML) INTRAVENOUS AS NEEDED
Status: DISCONTINUED | OUTPATIENT
Start: 2025-05-27 | End: 2025-05-27 | Stop reason: SURG

## 2025-05-27 RX ADMIN — PROPOFOL 100 MG: 10 INJECTION, EMULSION INTRAVENOUS at 09:02

## 2025-05-27 RX ADMIN — LIDOCAINE HYDROCHLORIDE 1 EACH: 40 SOLUTION TOPICAL at 09:04

## 2025-05-27 RX ADMIN — ACETAMINOPHEN 1000 MG: 500 TABLET, FILM COATED ORAL at 08:52

## 2025-05-27 RX ADMIN — CEFAZOLIN 2000 MG: 2 INJECTION, POWDER, FOR SOLUTION INTRAMUSCULAR; INTRAVENOUS at 09:08

## 2025-05-27 RX ADMIN — INSULIN HUMAN 10 UNITS: 100 INJECTION, SOLUTION PARENTERAL at 08:52

## 2025-05-27 RX ADMIN — FENTANYL CITRATE 50 MCG: 50 INJECTION, SOLUTION INTRAMUSCULAR; INTRAVENOUS at 09:00

## 2025-05-27 RX ADMIN — SUGAMMADEX 200 MG: 100 INJECTION, SOLUTION INTRAVENOUS at 10:14

## 2025-05-27 RX ADMIN — ONDANSETRON 4 MG: 2 INJECTION INTRAMUSCULAR; INTRAVENOUS at 09:45

## 2025-05-27 RX ADMIN — FENTANYL CITRATE 50 MCG: 50 INJECTION, SOLUTION INTRAMUSCULAR; INTRAVENOUS at 10:04

## 2025-05-27 RX ADMIN — ROCURONIUM 50 MG: 50 INJECTION, SOLUTION INTRAVENOUS at 09:03

## 2025-05-27 RX ADMIN — LIDOCAINE HYDROCHLORIDE 100 MG: 20 INJECTION, SOLUTION EPIDURAL; INFILTRATION; INTRACAUDAL; PERINEURAL at 09:02

## 2025-05-27 RX ADMIN — MIDAZOLAM HYDROCHLORIDE 0.5 MG: 1 INJECTION, SOLUTION INTRAMUSCULAR; INTRAVENOUS at 08:52

## 2025-05-27 RX ADMIN — SODIUM CHLORIDE, POTASSIUM CHLORIDE, SODIUM LACTATE AND CALCIUM CHLORIDE 1000 ML: 600; 310; 30; 20 INJECTION, SOLUTION INTRAVENOUS at 08:15

## 2025-05-27 NOTE — ANESTHESIA PREPROCEDURE EVALUATION
Anesthesia Evaluation     no history of anesthetic complications:   NPO Solid Status: > 8 hours  NPO Liquid Status: > 8 hours           Airway   Mallampati: I  Dental    (+) upper dentures    Pulmonary    (-) sleep apnea, not a smoker  Cardiovascular   Exercise tolerance: good (4-7 METS)    (+) hypertension, past MI , CAD, CABG (2015) >6 Months, cardiac stents (2 stents most recent prior to cabg 2015) more than 12 months ago , angina, hyperlipidemia    ROS comment: Pt reports recent heart cath with no major targets for intervention  Proceeded with medical management  Heart cath was done in [place] in feb/march      Neuro/Psych  (+) TIA  (-) seizures, CVA  GI/Hepatic/Renal/Endo    (+) morbid obesity, GERD, diabetes mellitus, thyroid problem hypothyroidism    Musculoskeletal     Abdominal    Substance History      OB/GYN          Other      history of cancer active                  Anesthesia Plan    ASA 4     general     (Pt with newly diagnosed diabetes, reports his medication was too expensive. He arrives today with blood sugar in 300s. Risks of infection discussed in great detail and I offered for procedure to be delayed if he so desired. He agrees and wishes to proceed. Will treat with insulin perioperatively. Pt instructed to contact his pcp and communicate his urgent need for outpatient treatment of diabetes.   Pt also has continued his plavix until yesterday, discussed with Dr Hannon and patient, he elects to proceed. )  intravenous induction     Anesthetic plan, risks, benefits, and alternatives have been provided, discussed and informed consent has been obtained with: patient.    CODE STATUS:

## 2025-05-27 NOTE — OP NOTE
PATIENT NAME:  Orlin Mims    DATE:  05/27/25    PREOPERATIVE DIAGNOSIS: Basal cell carcinoma skin of the left nasal sidewall    POSTOPERATIVE DIAGNOSIS: Basal cell carcinoma skin of the left nasal sidewall    PROCEDURE:  1) excision of malignant neoplasm skin of left nasal sidewall, 2.5 cm x 2.6 cm   2) full-thickness skin graft from left cheek to left nasal sidewall, 2.0 cm x 2.5 cm   3) complex closure skin of the left preauricular cheek, 6.5 cm    SURGEON:  Cory Hannon MD, FACS    FACILITY: [facility]    ANESTHESIA: General    DICTATED BY:  Cory Hannon MD, FACS    IVF: Per anesthesia     EBL: 20 cc    IMPLANTS: None    DRAINS: None    SPECIMENS: Basal cell carcinoma of the nose, stitch at 12:00-frozen    COMPLICATIONS: None apparent    INDICATIONS FOR SURGERY: Mr. Mims presented with a biopsy-proven basal cell carcinoma involving the left nasal sidewall.  He opted for surgical excision and reconstruction.    OPERATIVE FINDINGS:     Lesion: 15 mm x 16 mm   Margins: 5 mm  Defect: 25 mm x 26 mm  Graft: 2.0 cm x 2.5 cm  Depth: Intramuscular    OPERATIVE DETAILS:      After patient verification and informed consent was obtained, the patient was taken to the operating room and laid supine on the operative table.  The skin was cleansed with alcohol, the lesion was marked, and the skin of the left preauricular cheek and left nasal sidewall was infiltrated with of a mixture composed of 9 cc of 1% lidocaine with 1-100,000 epinephrine with 1 cc of 100mg/ml tranexamic acid. The skin was sterilely prepped with Betadine and the patient was sterilely draped.    The skin surrounding the lesion was incised in oval fashion with a 15 blade.  The skin was grasped and the lesion was removed from the underlying tissue utilizing sharp dissection down into the nasalis musculature with the 15 blade.  The specimen was oriented with a stitch at 12 o'clock and sent for frozen section analysis.   Hemostasis was obtained with bipolar cautery set at 15.      A skin graft was harvested from the left preauricular cheek.  A fresh 15 blade was used to make a fusiform incision in the skin and this was undermined in the immediate subcutaneous plane.  The skin was placed and the back table for later use soaked in saline.    The donor site skin was widely undermined anteriorly for 2.5 cm to facilitate closure using a fresh 15 blade.  Hemostasis was obtained with bipolar cautery set at 15.  The skin was then closed using deep 4-0 Vicryl followed by running locking 5-0 Prolene in a running, locking fashion.    The skin graft was thinned and the back table using curved iris scissors and trimmed for inset.  This was inset using 4-0 silk sutures with the ends left long for the bolster.  I then placed a running locking 5-0 fast absorbing gut and tacking sutures of 5-0 fast absorbing gut. A xeroform and cotton coated in bactroban ointment bolster was then placed and affixed using the silk sutures.       DISPOSITION:  The procedures were completed without complication and tolerated well.  The patient was released in the company of his daughter to return home in satisfactory condition.  A follow-up appointment will be scheduled, routine post-op medications prescribed (if required), and post-op instructions were given to the responsible party.           Cory Hannon MD, FACS  Board Certified Facial Plastic and Reconstructive Surgery  Board Certified Otolaryngology -- Head and Neck Surgery    Electronically signed by Cory Hannon MD, 05/27/25, 11:11 AM CDT.

## 2025-05-27 NOTE — ANESTHESIA PROCEDURE NOTES
Airway  Reason: elective    Date/Time: 5/27/2025 9:04 AM  Airway not difficult    General Information and Staff    Patient location during procedure: OR  CRNA/CAA: Celia Sabillon CRNA    Indications and Patient Condition  Indications for airway management: airway protection    Preoxygenated: yes    Mask difficulty assessment: 2 - vent by mask + OA or adjuvant +/- NMBA    Final Airway Details    Final airway type: endotracheal airway      Successful airway: ETT  Cuffed: yes   Successful intubation technique: direct laryngoscopy and video laryngoscopy  Adjuncts used in placement: intubating stylet  Endotracheal tube insertion site: oral  Blade: Perez  Blade size: 4  ETT size (mm): 7.5  Cormack-Lehane Classification: grade I - full view of glottis  Placement verified by: chest auscultation and capnometry   Cuff volume (mL): 7  Measured from: lips  ETT/EBT  to lips (cm): 22  Number of attempts at approach: 1  Assessment: lips, teeth, and gum same as pre-op and atraumatic intubation

## 2025-05-27 NOTE — H&P
Chief Complaint   Patient presents with    Basal Cell Carcinoma       BCC to left proximal  nasal sidewall .  Pt reports spot has been there for about a year.           Subjective  History of Present Illness:  Orlin Mims is a  79 y.o. male who presents for surgical excision of a biopsy-proven basal cell carcinoma of the left nasal sidewall.  Prior to the biopsy, the lesion had the following characteristics:     Quality: growing lesion  Severity: moderate  Duration: 1 year  Timing: constant  Modifying Factors: none  Associated Signs & Symptoms: feels like it is growing towards his eyelid     Derm: Katarina Tolbert     On Aspirin and Plavix - Dr. Beckman.  He was cleared to come off this, but has only held this for 1 day.    He is a newly diagnosed diabetic.  BD in the 300s.  Has not started his new medication due to the cost.     Review of Systems:  Review of Systems   Constitutional:  Negative for chills, fatigue, fever and unexpected weight change.   HENT:  Negative for facial swelling.    Respiratory:  Negative for cough, chest tightness and shortness of breath.    Cardiovascular:  Negative for chest pain.   Musculoskeletal:  Negative for neck pain.   Skin:  Negative for color change.   Neurological:  Negative for facial asymmetry.   Hematological:  Negative for adenopathy. Bruises/bleeds easily.      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       Past Surgical History:   Procedure Laterality Date    CARDIAC CATHETERIZATION N/A 05/23/2023    Procedure: Left Heart Cath;  Surgeon: Hector Beckman MD;  Location:  [facility];  Service: Cardiology;  Laterality:  N/A;    CARDIAC CATHETERIZATION      CHOLECYSTECTOMY  11/2023    DR HESS    COLONOSCOPY      CORONARY ANGIOPLASTY WITH STENT PLACEMENT      CORONARY ARTERY BYPASS GRAFT  2014    GALLBLADDER SURGERY      LASIK      PROSTATE SURGERY      TONSILLECTOMY         Family History   Problem Relation Age of Onset    Heart disease Mother     Lung cancer Father     Cancer Father     No Known Problems Sister     No Known Problems Brother        Social History     Socioeconomic History    Marital status: Single   Tobacco Use    Smoking status: Never    Smokeless tobacco: Never   Vaping Use    Vaping status: Never Used   Substance and Sexual Activity    Alcohol use: Not Currently     Comment: occasional    Drug use: No    Sexual activity: Defer       No Known Allergies    Current Outpatient Medications   Medication Instructions    aspirin 81 mg, Daily    atorvastatin (LIPITOR) 80 mg, Nightly    carvedilol (COREG) 3.125 mg, Oral, 2 Times Daily    clopidogrel (PLAVIX) 75 mg, Oral, Daily    ezetimibe (ZETIA) 10 mg, Oral, Daily    isosorbide mononitrate (IMDUR) 30 mg, Oral, Every Morning    losartan (COZAAR) 50 mg, Oral, Daily    nitroglycerin (NITROSTAT) 0.4 MG SL tablet 1 under the tongue as needed for angina, may repeat q5mins for up three doses    omeprazole (PRILOSEC) 40 mg, Daily    ranolazine (RANEXA) 500 MG 12 hr tablet          Current Facility-Administered Medications:     ceFAZolin 2000 mg IVPB in 100 mL NS (MBP), 2,000 mg, Intravenous, Once, Cory Hannon MD    lactated ringers infusion 1,000 mL, 1,000 mL, Intravenous, Continuous, Cory Hannon MD, Last Rate: 25 mL/hr at 05/27/25 0815, 1,000 mL at 05/27/25 0815    lidocaine PF 1% (XYLOCAINE) injection 0.5 mL, 0.5 mL, Intradermal, Once PRN, Cory Hannon MD    sodium chloride 0.9 % flush 3 mL, 3 mL, Intravenous, PRN, Cory Hannon MD         Objective  Vital Signs:  /76 (BP Location: Left arm, Patient Position: Sitting)   Pulse 58   Temp 96.7 °F (35.9  °C) (Temporal)   Resp 16   SpO2 95%       Physical Exam:    Constitutional:       General: He is not in acute distress.     Appearance: He is well-developed. He is not diaphoretic.   HENT:      Head: Normocephalic and atraumatic.        Right Ear: External ear normal.      Left Ear: External ear normal.      Nose: Nose normal.   Eyes:      General: No scleral icterus.        Right eye: No discharge.         Left eye: No discharge.      Conjunctiva/sclera: Conjunctivae normal.      Pupils: Pupils are equal, round, and reactive to light.   Neck:      Thyroid: No thyromegaly.      Vascular: No JVD.      Trachea: No tracheal deviation.   Pulmonary:      Effort: Pulmonary effort is normal.      Breath sounds: No stridor.   Musculoskeletal:         General: No deformity. Normal range of motion.      Cervical back: Normal range of motion and neck supple.   Lymphadenopathy:      Cervical: No cervical adenopathy.   Skin:     General: Skin is warm and dry.      Coloration: Skin is not pale.      Findings: No erythema or rash.   Neurological:      Mental Status: He is alert and oriented to person, place, and time.      Cranial Nerves: No cranial nerve deficit.      Coordination: Coordination normal.   Psychiatric:         Speech: Speech normal.         Behavior: Behavior normal. Behavior is cooperative.         Thought Content: Thought content normal.         Judgment: Judgment normal.            Data Review:          Operative plan:    Full-thickness skin graft versus rotational cheek advancement flap     Assessment  1. Basal cell carcinoma (BCC) of left side of nose    2. Antiplatelet or antithrombotic long-term use          Plan  I have offered excision of the basal cell carcinoma of the left nasal sidewall with likely full-thickness skin graft versus rotational cheek advancement flap  closure in the operating room under anesthesia.  I discussed the increased risk of bleeding, poor healing, and flap/graft loss with him  only being off his Plavix for 1 day.  He understands and wished to proceed.       Discussion of skin lesion. Discussed risks, benefits, alternatives, and possible complications of excision of the skin lesion with reconstruction utilizing local tissue rearrangement, full-thickness skin grafting, or local interpolated flaps. Risks include, but are not limited too: bleeding, infection, hematoma, recurrence, need for additional procedures, flap failure, cosmetic deformity. Patient understands risks and would like to proceed with surgery.      Electronically signed by Cory Hannon MD, 05/27/25, 8:41 AM CDT.

## 2025-05-27 NOTE — ANESTHESIA POSTPROCEDURE EVALUATION
Patient: Orlin Mims    Procedure Summary       Date: 05/27/25 Room / Location:  [facility] OR  /  [facility] OR    Anesthesia Start: 0858 Anesthesia Stop: 1025    Procedure: Excision of basal cell carcinoma of the left nose and medial canthus with likely graft closure (Left) Diagnosis:       Basal cell carcinoma (BCC) of left side of nose      (Basal cell carcinoma (BCC) of left side of nose [C44.311])    Surgeons: Cory Hannon MD Provider: Celia Sabillon CRNA    Anesthesia Type: general ASA Status: 4            Anesthesia Type: general    Vitals  Vitals Value Taken Time   /62 05/27/25 11:10   Temp 97.3 °F (36.3 °C) 05/27/25 11:10   Pulse 57 05/27/25 11:14   Resp 16 05/27/25 11:10   SpO2 97 % 05/27/25 11:14   Vitals shown include unfiled device data.        Post Anesthesia Care and Evaluation    Patient location during evaluation: PACU  Patient participation: complete - patient participated  Level of consciousness: awake and alert  Pain management: adequate    Airway patency: patent  Anesthetic complications: No anesthetic complications    Cardiovascular status: acceptable  Respiratory status: acceptable  Hydration status: acceptable    Comments: Blood pressure 139/62, pulse 53, temperature 97.3 °F (36.3 °C), temperature source Temporal, resp. rate 16, SpO2 98%.    Pt discharged from PACU based on david score >8

## 2025-05-28 LAB
CYTO UR: NORMAL
LAB AP CASE REPORT: NORMAL
Lab: NORMAL
Lab: NORMAL
PATH REPORT.FINAL DX SPEC: NORMAL
PATH REPORT.GROSS SPEC: NORMAL

## 2025-05-30 LAB — GLUCOSE BLDC GLUCOMTR-MCNC: 330 MG/DL (ref 70–130)

## 2025-06-03 ENCOUNTER — OFFICE VISIT (OUTPATIENT)
Age: 80
End: 2025-06-03
Payer: MEDICARE

## 2025-06-03 VITALS
RESPIRATION RATE: 20 BRPM | TEMPERATURE: 98 F | BODY MASS INDEX: 32.74 KG/M2 | HEART RATE: 85 BPM | SYSTOLIC BLOOD PRESSURE: 130 MMHG | HEIGHT: 68 IN | DIASTOLIC BLOOD PRESSURE: 76 MMHG | WEIGHT: 216 LBS

## 2025-06-03 DIAGNOSIS — Z79.02 ANTIPLATELET OR ANTITHROMBOTIC LONG-TERM USE: ICD-10-CM

## 2025-06-03 DIAGNOSIS — C44.311 BASAL CELL CARCINOMA (BCC) OF LEFT SIDE OF NOSE: Primary | ICD-10-CM

## 2025-06-03 LAB
QT INTERVAL: 440 MS
QTC INTERVAL: 431 MS

## 2025-06-03 PROCEDURE — 3075F SYST BP GE 130 - 139MM HG: CPT | Performed by: NURSE PRACTITIONER

## 2025-06-03 PROCEDURE — 1159F MED LIST DOCD IN RCRD: CPT | Performed by: NURSE PRACTITIONER

## 2025-06-03 PROCEDURE — 1160F RVW MEDS BY RX/DR IN RCRD: CPT | Performed by: NURSE PRACTITIONER

## 2025-06-03 PROCEDURE — 99024 POSTOP FOLLOW-UP VISIT: CPT | Performed by: NURSE PRACTITIONER

## 2025-06-03 PROCEDURE — 3078F DIAST BP <80 MM HG: CPT | Performed by: NURSE PRACTITIONER

## 2025-06-03 NOTE — PROGRESS NOTES
DIAMANTE Ricketts  [facility] [place] [facility]  [address], [address]  Phone: [phone]  Fax: [phone]     PRIMARY CARE PROVIDER: Herminia Mejia MD  REFERRING PROVIDER: No ref. provider found    Chief Complaint   Patient presents with    Post-op     Post-op suture removal of nose       Subjective   History of Present Illness:  Orlin Mims is a  79 y.o. male who presents for follow up s/p excision of a basal cell carcinoma of the left nasal sidewall with full-thickness skin graft from the left preauricular cheek to left nasal sidewall and complex closure of the left preauricular cheek on 2025.  Postoperatively, he has been doing very well.  He denies fever, chills, bleeding, or drainage.    Derm: Katarina Tolbert     On Aspirin and Plavix - Dr. Beckman    Review of Systems:  Review of Systems   Constitutional:  Negative for chills, fatigue, fever and unexpected weight change.   HENT:  Negative for facial swelling.    Respiratory:  Negative for cough, chest tightness and shortness of breath.    Cardiovascular:  Negative for chest pain.   Musculoskeletal:  Negative for neck pain.   Skin:  Negative for color change.   Neurological:  Negative for facial asymmetry.   Hematological:  Negative for adenopathy. Bruises/bleeds easily.       Past History:  Past Medical History:   Diagnosis Date    CAD (coronary artery disease)     Cancer     Prostate skin basal cell nose    Chest pain     Diabetes mellitus     GERD (gastroesophageal reflux disease)     HLD (hyperlipidemia)     HTN (hypertension)     MI (myocardial infarction)     Ruptured ear drum, right     TIA (transient ischemic attack)     Vertebrobasilar occlusive disease 2020     Past Surgical History:   Procedure Laterality Date    CARDIAC CATHETERIZATION N/A 2023    Procedure: Left Heart Cath;  Surgeon: Hector Beckman MD;  Location: [facility] CATH INVASIVE LOCATION;  Service: Cardiology;  Laterality: N/A;     CARDIAC CATHETERIZATION      CHOLECYSTECTOMY  11/2023    DR HESS    COLONOSCOPY      CORONARY ANGIOPLASTY WITH STENT PLACEMENT      CORONARY ARTERY BYPASS GRAFT  2014    GALLBLADDER SURGERY      LASIK      PROSTATE SURGERY      SKIN FULL THICKNESS GRAFT Left 5/27/2025    Procedure: Excision of basal cell carcinoma of the left nose and medial canthus with likely graft closure;  Surgeon: Cory Hannon MD;  Location: [facility] OR;  Service: ENT;  Laterality: Left;    TONSILLECTOMY       Family History   Problem Relation Age of Onset    Heart disease Mother     Lung cancer Father     Cancer Father     No Known Problems Sister     No Known Problems Brother      Social History     Tobacco Use    Smoking status: Never    Smokeless tobacco: Never   Vaping Use    Vaping status: Never Used   Substance Use Topics    Alcohol use: Not Currently     Comment: occasional    Drug use: No     Allergies:  Patient has no known allergies.    Current Outpatient Medications:     aspirin 81 MG EC tablet, Take 1 tablet by mouth Daily., Disp: , Rfl:     atorvastatin (LIPITOR) 80 MG tablet, Take 1 tablet by mouth Every Night., Disp: , Rfl:     carvedilol (COREG) 3.125 MG tablet, Take 1 tablet by mouth 2 (Two) Times a Day., Disp: 60 tablet, Rfl: 11    clopidogrel (PLAVIX) 75 MG tablet, Take 1 tablet by mouth Daily., Disp: 90 tablet, Rfl: 3    doxycycline (Vibramycin) 100 MG capsule, Take 1 capsule by mouth 2 (Two) Times a Day for 14 days., Disp: 28 capsule, Rfl: 0    ezetimibe (ZETIA) 10 MG tablet, Take 1 tablet by mouth Daily., Disp: 90 tablet, Rfl: 3    isosorbide mononitrate (IMDUR) 30 MG 24 hr tablet, Take 1 tablet by mouth Every Morning., Disp: 30 tablet, Rfl: 11    losartan (COZAAR) 50 MG tablet, Take 1 tablet by mouth Daily., Disp: 90 tablet, Rfl: 3    nitroglycerin (NITROSTAT) 0.4 MG SL tablet, 1 under the tongue as needed for angina, may repeat q5mins for up three doses, Disp: 25 tablet, Rfl: 11    omeprazole (priLOSEC) 40 MG  capsule, Take 1 capsule by mouth Daily., Disp: , Rfl:     ranolazine (RANEXA) 500 MG 12 hr tablet, , Disp: , Rfl:       Objective     Vital Signs:  Temp:  [98 °F (36.7 °C)] 98 °F (36.7 °C)  Heart Rate:  [85] 85  Resp:  [20] 20  BP: (130)/(76) 130/76    Physical Exam:  Physical Exam  Vitals reviewed.   Constitutional:       General: He is not in acute distress.     Appearance: He is well-developed.   HENT:      Head: Normocephalic and atraumatic.        Right Ear: External ear normal.      Left Ear: External ear normal.      Nose:        Mouth/Throat:      Lips: Pink.   Eyes:      General: Lids are normal.   Pulmonary:      Effort: Pulmonary effort is normal. No respiratory distress.      Breath sounds: No stridor.   Musculoskeletal:      Cervical back: Neck supple.   Neurological:      Mental Status: He is alert and oriented to person, place, and time.   Psychiatric:         Mood and Affect: Mood normal.         Speech: Speech normal.         Behavior: Behavior normal. Behavior is cooperative.         Results Review:         Assessment   Assessment:  1. Basal cell carcinoma (BCC) of left side of nose    2. Antiplatelet or antithrombotic long-term use        Plan   Plan:    Continue to apply a thin coat of Vaseline to the graft site 3-4 times a day.  You may briefly allow soapy water to run over the graft.  Do not rub, scrub, or brush the graft.  Blot the graft after bathing with a dry towel.  Continue with a thin coat of Vaseline for a total of 6 weeks postoperatively.    No orders of the defined types were placed in this encounter.    There are no Patient Instructions on file for this visit.  Return in about 2 weeks (around 6/17/2025), or if symptoms worsen or fail to improve, for Recheck.    My findings and recommendations were discussed and questions were answered.     Alvina Park, DIAMANTE  06/03/25  14:22 CDT

## 2025-06-16 ENCOUNTER — OFFICE VISIT (OUTPATIENT)
Age: 80
End: 2025-06-16
Payer: MEDICARE

## 2025-06-16 VITALS
WEIGHT: 216 LBS | SYSTOLIC BLOOD PRESSURE: 136 MMHG | TEMPERATURE: 98 F | HEIGHT: 68 IN | RESPIRATION RATE: 20 BRPM | DIASTOLIC BLOOD PRESSURE: 74 MMHG | BODY MASS INDEX: 32.74 KG/M2 | HEART RATE: 80 BPM

## 2025-06-16 DIAGNOSIS — Z79.02 ANTIPLATELET OR ANTITHROMBOTIC LONG-TERM USE: ICD-10-CM

## 2025-06-16 DIAGNOSIS — C44.311 BASAL CELL CARCINOMA (BCC) OF LEFT SIDE OF NOSE: Primary | ICD-10-CM

## 2025-06-16 PROCEDURE — 3075F SYST BP GE 130 - 139MM HG: CPT | Performed by: NURSE PRACTITIONER

## 2025-06-16 PROCEDURE — 3078F DIAST BP <80 MM HG: CPT | Performed by: NURSE PRACTITIONER

## 2025-06-16 PROCEDURE — 99024 POSTOP FOLLOW-UP VISIT: CPT | Performed by: NURSE PRACTITIONER

## 2025-06-16 PROCEDURE — 1160F RVW MEDS BY RX/DR IN RCRD: CPT | Performed by: NURSE PRACTITIONER

## 2025-06-16 PROCEDURE — 1159F MED LIST DOCD IN RCRD: CPT | Performed by: NURSE PRACTITIONER

## 2025-06-16 NOTE — PROGRESS NOTES
DIAMANTE Ricketts  [facility]  [address], [address]  Phone: [phone]  Fax: [phone]     PRIMARY CARE PROVIDER: Herminia Mejia MD  REFERRING PROVIDER: No ref. provider found    Chief Complaint   Patient presents with    Follow-up     F/u wound check       Subjective   History of Present Illness:  Orlin Mims is a  79 y.o. male who presents for follow up s/p excision of a basal cell carcinoma of the left nasal sidewall with full-thickness skin graft from the left preauricular cheek to left nasal sidewall and complex closure of the left preauricular cheek on 2025.  Postoperatively, he has been doing very well.  He denies fever, chills, bleeding, or drainage.    Derm: Katarina Tolbert     On Aspirin and Plavix - Dr. Beckman    Review of Systems:  Review of Systems   Constitutional:  Negative for chills, fatigue, fever and unexpected weight change.   HENT:  Negative for facial swelling.    Respiratory:  Negative for cough, chest tightness and shortness of breath.    Cardiovascular:  Negative for chest pain.   Musculoskeletal:  Negative for neck pain.   Skin:  Negative for color change.   Neurological:  Negative for facial asymmetry.   Hematological:  Negative for adenopathy. Bruises/bleeds easily.       Past History:  Past Medical History:   Diagnosis Date    CAD (coronary artery disease)     Cancer     Prostate skin basal cell nose    Chest pain     Diabetes mellitus     GERD (gastroesophageal reflux disease)     HLD (hyperlipidemia)     HTN (hypertension)     MI (myocardial infarction)     Ruptured ear drum, right     TIA (transient ischemic attack)     Vertebrobasilar occlusive disease 2020     Past Surgical History:   Procedure Laterality Date    CARDIAC CATHETERIZATION N/A 2023    Procedure: Left Heart Cath;  Surgeon: Hector Beckman MD;  Location: [facility] CATH INVASIVE LOCATION;  Service: Cardiology;  Laterality: N/A;    CARDIAC  CATHETERIZATION      CHOLECYSTECTOMY  11/2023    DR HESS    COLONOSCOPY      CORONARY ANGIOPLASTY WITH STENT PLACEMENT      CORONARY ARTERY BYPASS GRAFT  2014    GALLBLADDER SURGERY      LASIK      PROSTATE SURGERY      SKIN FULL THICKNESS GRAFT Left 5/27/2025    Procedure: Excision of basal cell carcinoma of the left nose and medial canthus with likely graft closure;  Surgeon: Cory Hannon MD;  Location: [facility] OR;  Service: ENT;  Laterality: Left;    TONSILLECTOMY       Family History   Problem Relation Age of Onset    Heart disease Mother     Lung cancer Father     Cancer Father     No Known Problems Sister     No Known Problems Brother      Social History     Tobacco Use    Smoking status: Never    Smokeless tobacco: Never   Vaping Use    Vaping status: Never Used   Substance Use Topics    Alcohol use: Not Currently     Comment: occasional    Drug use: No     Allergies:  Patient has no known allergies.    Current Outpatient Medications:     aspirin 81 MG EC tablet, Take 1 tablet by mouth Daily., Disp: , Rfl:     atorvastatin (LIPITOR) 80 MG tablet, Take 1 tablet by mouth Every Night., Disp: , Rfl:     carvedilol (COREG) 3.125 MG tablet, Take 1 tablet by mouth 2 (Two) Times a Day., Disp: 60 tablet, Rfl: 11    clopidogrel (PLAVIX) 75 MG tablet, Take 1 tablet by mouth Daily., Disp: 90 tablet, Rfl: 3    ezetimibe (ZETIA) 10 MG tablet, Take 1 tablet by mouth Daily., Disp: 90 tablet, Rfl: 3    isosorbide mononitrate (IMDUR) 30 MG 24 hr tablet, Take 1 tablet by mouth Every Morning., Disp: 30 tablet, Rfl: 11    losartan (COZAAR) 50 MG tablet, Take 1 tablet by mouth Daily., Disp: 90 tablet, Rfl: 3    nitroglycerin (NITROSTAT) 0.4 MG SL tablet, 1 under the tongue as needed for angina, may repeat q5mins for up three doses, Disp: 25 tablet, Rfl: 11    omeprazole (priLOSEC) 40 MG capsule, Take 1 capsule by mouth Daily., Disp: , Rfl:     ranolazine (RANEXA) 500 MG 12 hr tablet, , Disp: , Rfl:       Objective     Vital  Signs:  Temp:  [98 °F (36.7 °C)] 98 °F (36.7 °C)  Heart Rate:  [80] 80  Resp:  [20] 20  BP: (136)/(74) 136/74    Physical Exam:  Physical Exam  Vitals reviewed.   Constitutional:       General: He is not in acute distress.     Appearance: He is well-developed.   HENT:      Head: Normocephalic and atraumatic.        Right Ear: External ear normal.      Left Ear: External ear normal.      Nose:        Mouth/Throat:      Lips: Pink.   Eyes:      General: Lids are normal.   Pulmonary:      Effort: Pulmonary effort is normal. No respiratory distress.      Breath sounds: No stridor.   Musculoskeletal:      Cervical back: Neck supple.   Neurological:      Mental Status: He is alert and oriented to person, place, and time.   Psychiatric:         Mood and Affect: Mood normal.         Speech: Speech normal.         Behavior: Behavior normal. Behavior is cooperative.         Results Review:         Assessment   Assessment:  1. Basal cell carcinoma (BCC) of left side of nose    2. Antiplatelet or antithrombotic long-term use          Plan   Plan:    Continue to apply a thin coat of Vaseline to the graft site 3-4 times a day.  You may briefly allow soapy water to run over the graft.  Do not rub, scrub, or brush the graft.  Blot the graft after bathing with a dry towel.  Continue with a thin coat of Vaseline for a total of 6 weeks postoperatively.    No orders of the defined types were placed in this encounter.    There are no Patient Instructions on file for this visit.  Return in about 2 weeks (around 6/30/2025), or if symptoms worsen or fail to improve, for Recheck.    My findings and recommendations were discussed and questions were answered.     DIAMANTE Vance  06/16/25  12:26 CDT

## 2025-06-30 ENCOUNTER — OFFICE VISIT (OUTPATIENT)
Age: 80
End: 2025-06-30
Payer: MEDICARE

## 2025-06-30 VITALS
RESPIRATION RATE: 20 BRPM | HEART RATE: 82 BPM | SYSTOLIC BLOOD PRESSURE: 142 MMHG | HEIGHT: 68 IN | WEIGHT: 216 LBS | BODY MASS INDEX: 32.74 KG/M2 | DIASTOLIC BLOOD PRESSURE: 76 MMHG | TEMPERATURE: 98 F

## 2025-06-30 DIAGNOSIS — Z79.02 ANTIPLATELET OR ANTITHROMBOTIC LONG-TERM USE: ICD-10-CM

## 2025-06-30 DIAGNOSIS — C44.311 BASAL CELL CARCINOMA (BCC) OF LEFT SIDE OF NOSE: Primary | ICD-10-CM

## 2025-06-30 PROCEDURE — 99024 POSTOP FOLLOW-UP VISIT: CPT | Performed by: NURSE PRACTITIONER

## 2025-06-30 NOTE — PROGRESS NOTES
DIAMANTE Ricketst  [facility] [place] [facility]  [address], [address]  Phone: [phone]  Fax: [phone]     PRIMARY CARE PROVIDER: Herminia Mejia MD  REFERRING PROVIDER: No ref. provider found    Chief Complaint   Patient presents with    Follow-up     F/u wound check       Subjective   History of Present Illness:  Orlin Mims is a  79 y.o. male who presents for follow up s/p excision of a basal cell carcinoma of the left nasal sidewall with full-thickness skin graft from the left preauricular cheek to left nasal sidewall and complex closure of the left preauricular cheek on 2025.  Postoperatively, he has been doing very well.  He denies fever, chills, bleeding, or drainage.  He is pleased with how it is healing.    Derm: Katarina Tolbert     On Aspirin and Plavix - Dr. Beckman    Review of Systems:  Review of Systems   Constitutional:  Negative for chills, fatigue, fever and unexpected weight change.   HENT:  Negative for facial swelling.    Respiratory:  Negative for cough, chest tightness and shortness of breath.    Cardiovascular:  Negative for chest pain.   Musculoskeletal:  Negative for neck pain.   Skin:  Negative for color change.   Neurological:  Negative for facial asymmetry.   Hematological:  Negative for adenopathy. Bruises/bleeds easily.       Past History:  Past Medical History:   Diagnosis Date    CAD (coronary artery disease)     Cancer     Prostate skin basal cell nose    Chest pain     Diabetes mellitus     GERD (gastroesophageal reflux disease)     HLD (hyperlipidemia)     HTN (hypertension)     MI (myocardial infarction)     Ruptured ear drum, right     TIA (transient ischemic attack)     Vertebrobasilar occlusive disease 2020     Past Surgical History:   Procedure Laterality Date    CARDIAC CATHETERIZATION N/A 2023    Procedure: Left Heart Cath;  Surgeon: Hector Beckman MD;  Location: [facility] CATH INVASIVE LOCATION;  Service: Cardiology;   Laterality: N/A;    CARDIAC CATHETERIZATION      CHOLECYSTECTOMY  11/2023    DR HESS    COLONOSCOPY      CORONARY ANGIOPLASTY WITH STENT PLACEMENT      CORONARY ARTERY BYPASS GRAFT  2014    GALLBLADDER SURGERY      LASIK      PROSTATE SURGERY      SKIN FULL THICKNESS GRAFT Left 5/27/2025    Procedure: Excision of basal cell carcinoma of the left nose and medial canthus with likely graft closure;  Surgeon: Cory Hannon MD;  Location: [facility] OR;  Service: ENT;  Laterality: Left;    TONSILLECTOMY       Family History   Problem Relation Age of Onset    Heart disease Mother     Lung cancer Father     Cancer Father     No Known Problems Sister     No Known Problems Brother      Social History     Tobacco Use    Smoking status: Never    Smokeless tobacco: Never   Vaping Use    Vaping status: Never Used   Substance Use Topics    Alcohol use: Not Currently     Comment: occasional    Drug use: No     Allergies:  Patient has no known allergies.    Current Outpatient Medications:     aspirin 81 MG EC tablet, Take 1 tablet by mouth Daily., Disp: , Rfl:     atorvastatin (LIPITOR) 80 MG tablet, Take 1 tablet by mouth Every Night., Disp: , Rfl:     carvedilol (COREG) 3.125 MG tablet, Take 1 tablet by mouth 2 (Two) Times a Day., Disp: 60 tablet, Rfl: 11    clopidogrel (PLAVIX) 75 MG tablet, Take 1 tablet by mouth Daily., Disp: 90 tablet, Rfl: 3    ezetimibe (ZETIA) 10 MG tablet, Take 1 tablet by mouth Daily., Disp: 90 tablet, Rfl: 3    isosorbide mononitrate (IMDUR) 30 MG 24 hr tablet, Take 1 tablet by mouth Every Morning., Disp: 30 tablet, Rfl: 11    losartan (COZAAR) 50 MG tablet, Take 1 tablet by mouth Daily., Disp: 90 tablet, Rfl: 3    nitroglycerin (NITROSTAT) 0.4 MG SL tablet, 1 under the tongue as needed for angina, may repeat q5mins for up three doses, Disp: 25 tablet, Rfl: 11    omeprazole (priLOSEC) 40 MG capsule, Take 1 capsule by mouth Daily., Disp: , Rfl:     ranolazine (RANEXA) 500 MG 12 hr tablet, , Disp: , Rfl:        Objective     Vital Signs:  Temp:  [98 °F (36.7 °C)] 98 °F (36.7 °C)  Heart Rate:  [82] 82  Resp:  [20] 20  BP: (142)/(76) 142/76    Physical Exam:  Physical Exam  Vitals reviewed.   Constitutional:       General: He is not in acute distress.     Appearance: He is well-developed.   HENT:      Head: Normocephalic and atraumatic.        Right Ear: External ear normal.      Left Ear: External ear normal.      Nose:        Mouth/Throat:      Lips: Pink.   Eyes:      General: Lids are normal.   Pulmonary:      Effort: Pulmonary effort is normal. No respiratory distress.      Breath sounds: No stridor.   Musculoskeletal:      Cervical back: Neck supple.   Neurological:      Mental Status: He is alert and oriented to person, place, and time.   Psychiatric:         Mood and Affect: Mood normal.         Speech: Speech normal.         Behavior: Behavior normal. Behavior is cooperative.         Results Review:         Assessment   Assessment:  1. Basal cell carcinoma (BCC) of left side of nose    2. Antiplatelet or antithrombotic long-term use            Plan   Plan:  In order to optimize wound healing, it is beneficial to protect the incisions from sunlight for the first year after the procedure.  Sunlight exposure may cause a brown-red discoloration to the incisions, making them more obvious.  Use a sunscreen with titanium dioxide and/or zinc oxide as the active ingredient(s).  Utilize an SPF factor of at least 15.    Use an OTC silicone-based wound cream to the incision daily to optimize wound healing. Massage.     It is important to follow-up with your primary care provider or dermatologist every 6 to 12 months for routine skin cancer surveillance.  He is going to schedule a follow-up appointment with his dermatologist.    No orders of the defined types were placed in this encounter.    There are no Patient Instructions on file for this visit.  Return in about 6 months (around 12/30/2025), or if symptoms worsen or  fail to improve, for Recheck.    My findings and recommendations were discussed and questions were answered.     Alvina Park, DIAMANTE  06/30/25  12:23 CDT

## (undated) DEVICE — STERILE COTTON BALLS LARGE 5/P: Brand: MEDLINE

## (undated) DEVICE — ADHS SKIN MASTISOL CAP 2OZ DISP

## (undated) DEVICE — MODEL AT P65, P/N 701554-001KIT CONTENTS: HAND CONTROLLER, 3-WAY HIGH-PRESSURE STOPCOCK WITH ROTATING END AND PREMIUM HIGH-PRESSURE TUBING: Brand: ANGIOTOUCH® KIT

## (undated) DEVICE — SUT VIC 4/0 PC3 18IN UD VCP845G

## (undated) DEVICE — GW STARTER FXD CORE J .035 3X150CM 3MM

## (undated) DEVICE — CABL BIPOL MEGADYNE 12FT DISP

## (undated) DEVICE — SOL IRR NACL 0.9PCT BT 1000ML

## (undated) DEVICE — PROXIMATE RH ROTATING HEAD SKIN STAPLERS (35 WIDE) CONTAINS 35 STAINLESS STEEL STAPLES: Brand: PROXIMATE

## (undated) DEVICE — PK CATH CARD 30 CA/4

## (undated) DEVICE — PK ENT HD AND NK 30

## (undated) DEVICE — SUT SILK 2/0 FS BLK 18IN 685G

## (undated) DEVICE — SUT SILK 4/0 RB1CR8 18IN C054D

## (undated) DEVICE — FR5 INFINITI MULTIPAC: Brand: INFINITI

## (undated) DEVICE — PINNACLE INTRODUCER SHEATH: Brand: PINNACLE

## (undated) DEVICE — TOWEL,OR,DSP,ST,BLUE,STD,4/PK,20PK/CS: Brand: MEDLINE

## (undated) DEVICE — DRESSING,GAUZE,XEROFORM,CURAD,5"X9",ST: Brand: CURAD

## (undated) DEVICE — SUT GUT PLN FAST ABS 5/0 PC1 18IN 1915G

## (undated) DEVICE — ELECTRD NDL EZ CLN MOD 2.75IN

## (undated) DEVICE — MODEL BT2000 P/N 700287-012KIT CONTENTS: MANIFOLD WITH SALINE AND CONTRAST PORTS, SALINE TUBING WITH SPIKE AND HAND SYRINGE, TRANSDUCER: Brand: BT2000 AUTOMATED MANIFOLD KIT

## (undated) DEVICE — UTILITY MARKER W/MED LABELS: Brand: MEDLINE

## (undated) DEVICE — CANN NASL ETCO2 LO/FLO A/

## (undated) DEVICE — STRIP CLS WND CURAD MEDI/STRIP HYPOALLERG 0.25X4IN PK/10

## (undated) DEVICE — ANTIBACTERIAL UNDYED BRAIDED (POLYGLACTIN 910), SYNTHETIC ABSORBABLE SUTURE: Brand: COATED VICRYL

## (undated) DEVICE — GLV SURG BIOGEL LTX PF 8

## (undated) DEVICE — SOLIDIFIER LIQUI LOC PLUS 2000CC

## (undated) DEVICE — PAD, DEFIB, ADULT, RADIOTRANS, PHYSIO: Brand: MEDLINE